# Patient Record
Sex: FEMALE
[De-identification: names, ages, dates, MRNs, and addresses within clinical notes are randomized per-mention and may not be internally consistent; named-entity substitution may affect disease eponyms.]

---

## 2020-05-02 ENCOUNTER — NURSE TRIAGE (OUTPATIENT)
Dept: OTHER | Facility: CLINIC | Age: 75
End: 2020-05-02
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
## 2023-01-01 ENCOUNTER — HOSPITAL ENCOUNTER (OUTPATIENT)
Dept: DATA CONVERSION | Facility: HOSPITAL | Age: 78
Discharge: HOME | End: 2023-09-04
Payer: MEDICARE

## 2023-01-01 ENCOUNTER — APPOINTMENT (OUTPATIENT)
Dept: HEMATOLOGY/ONCOLOGY | Facility: CLINIC | Age: 78
End: 2023-01-01
Payer: MEDICARE

## 2023-01-01 ENCOUNTER — HOSPITAL ENCOUNTER (OUTPATIENT)
Dept: DATA CONVERSION | Facility: HOSPITAL | Age: 78
Discharge: HOME | End: 2023-09-28
Payer: MEDICARE

## 2023-01-01 ENCOUNTER — APPOINTMENT (OUTPATIENT)
Dept: LAB | Facility: CLINIC | Age: 78
End: 2023-01-01
Payer: MEDICARE

## 2023-01-01 ENCOUNTER — HOSPITAL ENCOUNTER (OUTPATIENT)
Dept: DATA CONVERSION | Facility: HOSPITAL | Age: 78
End: 2023-01-01

## 2023-01-01 ENCOUNTER — HOSPITAL ENCOUNTER (OUTPATIENT)
Dept: DATA CONVERSION | Facility: HOSPITAL | Age: 78
Discharge: HOME | End: 2023-09-05
Payer: MEDICARE

## 2023-01-01 ENCOUNTER — HOSPITAL ENCOUNTER (OUTPATIENT)
Dept: DATA CONVERSION | Facility: HOSPITAL | Age: 78
Setting detail: OBSERVATION
Discharge: HOME | End: 2023-09-29
Attending: INTERNAL MEDICINE | Admitting: INTERNAL MEDICINE
Payer: MEDICARE

## 2023-01-01 ENCOUNTER — HOSPITAL ENCOUNTER (OUTPATIENT)
Facility: HOSPITAL | Age: 78
Setting detail: OBSERVATION
Discharge: HOME | End: 2023-10-06
Attending: EMERGENCY MEDICINE | Admitting: INTERNAL MEDICINE
Payer: MEDICARE

## 2023-01-01 ENCOUNTER — SOCIAL WORK (OUTPATIENT)
Dept: HEMATOLOGY/ONCOLOGY | Facility: CLINIC | Age: 78
End: 2023-01-01
Payer: MEDICARE

## 2023-01-01 ENCOUNTER — TELEPHONE (OUTPATIENT)
Dept: HEMATOLOGY/ONCOLOGY | Facility: CLINIC | Age: 78
End: 2023-01-01
Payer: MEDICARE

## 2023-01-01 ENCOUNTER — APPOINTMENT (OUTPATIENT)
Dept: RADIOLOGY | Facility: HOSPITAL | Age: 78
End: 2023-01-01
Payer: MEDICARE

## 2023-01-01 ENCOUNTER — HOSPITAL ENCOUNTER (OUTPATIENT)
Dept: CARDIOLOGY | Facility: HOSPITAL | Age: 78
Discharge: HOME | End: 2023-10-11
Payer: MEDICARE

## 2023-01-01 ENCOUNTER — DOCUMENTATION (OUTPATIENT)
Dept: CARDIAC SURGERY | Facility: CLINIC | Age: 78
End: 2023-01-01

## 2023-01-01 VITALS
HEART RATE: 80 BPM | DIASTOLIC BLOOD PRESSURE: 56 MMHG | BODY MASS INDEX: 36.14 KG/M2 | OXYGEN SATURATION: 100 % | WEIGHT: 216.93 LBS | TEMPERATURE: 98.4 F | SYSTOLIC BLOOD PRESSURE: 127 MMHG | RESPIRATION RATE: 18 BRPM | HEIGHT: 65 IN

## 2023-01-01 VITALS — HEIGHT: 65 IN | BODY MASS INDEX: 17.96 KG/M2 | WEIGHT: 107.81 LBS

## 2023-01-01 DIAGNOSIS — T85.9XXA UNSPECIFIED COMPLICATION OF INTERNAL PROSTHETIC DEVICE, IMPLANT AND GRAFT, INITIAL ENCOUNTER: ICD-10-CM

## 2023-01-01 DIAGNOSIS — C34.91: ICD-10-CM

## 2023-01-01 DIAGNOSIS — J98.59 MEDIASTINAL MASS: ICD-10-CM

## 2023-01-01 DIAGNOSIS — J94.8 HYDROPNEUMOTHORAX: ICD-10-CM

## 2023-01-01 DIAGNOSIS — Z98.890 OTHER SPECIFIED POSTPROCEDURAL STATES: ICD-10-CM

## 2023-01-01 DIAGNOSIS — R06.02 SHORTNESS OF BREATH: Primary | ICD-10-CM

## 2023-01-01 DIAGNOSIS — J91.0 MALIGNANT PLEURAL EFFUSION (CMS-HCC): ICD-10-CM

## 2023-01-01 DIAGNOSIS — K21.9 GASTROESOPHAGEAL REFLUX DISEASE WITHOUT ESOPHAGITIS: ICD-10-CM

## 2023-01-01 DIAGNOSIS — Z29.89 ENCOUNTER FOR OTHER SPECIFIED PROPHYLACTIC MEASURES: ICD-10-CM

## 2023-01-01 DIAGNOSIS — C34.11 MALIGNANT NEOPLASM OF UPPER LOBE, RIGHT BRONCHUS OR LUNG (MULTI): ICD-10-CM

## 2023-01-01 DIAGNOSIS — C34.91 NON-SMALL CELL CANCER OF RIGHT LUNG (MULTI): ICD-10-CM

## 2023-01-01 DIAGNOSIS — J90 PLEURAL EFFUSION, NOT ELSEWHERE CLASSIFIED: ICD-10-CM

## 2023-01-01 DIAGNOSIS — C34.91 MALIGNANT NEOPLASM OF UNSPECIFIED PART OF RIGHT BRONCHUS OR LUNG (MULTI): ICD-10-CM

## 2023-01-01 DIAGNOSIS — Z88.0 ALLERGY STATUS TO PENICILLIN: ICD-10-CM

## 2023-01-01 DIAGNOSIS — C34.91: Primary | ICD-10-CM

## 2023-01-01 DIAGNOSIS — Z28.9 IMMUNIZATION NOT CARRIED OUT FOR UNSPECIFIED REASON: ICD-10-CM

## 2023-01-01 DIAGNOSIS — R06.00 DYSPNEA, UNSPECIFIED: ICD-10-CM

## 2023-01-01 DIAGNOSIS — F17.200 TOBACCO DEPENDENCE: ICD-10-CM

## 2023-01-01 DIAGNOSIS — Z28.310 UNVACCINATED FOR COVID-19: ICD-10-CM

## 2023-01-01 DIAGNOSIS — K59.03 DRUG-INDUCED CONSTIPATION: ICD-10-CM

## 2023-01-01 DIAGNOSIS — F51.01 PRIMARY INSOMNIA: ICD-10-CM

## 2023-01-01 DIAGNOSIS — Z20.822 CONTACT WITH AND (SUSPECTED) EXPOSURE TO COVID-19: ICD-10-CM

## 2023-01-01 DIAGNOSIS — Z85.118 PERSONAL HISTORY OF OTHER MALIGNANT NEOPLASM OF BRONCHUS AND LUNG: ICD-10-CM

## 2023-01-01 DIAGNOSIS — T85.698A OTHER MECHANICAL COMPLICATION OF OTHER SPECIFIED INTERNAL PROSTHETIC DEVICES, IMPLANTS AND GRAFTS, INITIAL ENCOUNTER: ICD-10-CM

## 2023-01-01 DIAGNOSIS — C34.90 MALIGNANT NEOPLASM OF UNSPECIFIED PART OF UNSPECIFIED BRONCHUS OR LUNG (MULTI): ICD-10-CM

## 2023-01-01 DIAGNOSIS — R06.02 SHORTNESS OF BREATH: ICD-10-CM

## 2023-01-01 LAB
ALBUMIN SERPL-MCNC: 3 GM/DL (ref 3.5–5)
ALBUMIN SERPL-MCNC: 3.4 GM/DL (ref 3.5–5)
ALBUMIN SERPL-MCNC: 3.6 GM/DL (ref 3.5–5)
ALBUMIN/GLOB SERPL: 1.1 RATIO (ref 1.5–3)
ALBUMIN/GLOB SERPL: 1.2 RATIO (ref 1.5–3)
ALBUMIN/GLOB SERPL: 1.3 RATIO (ref 1.5–3)
ALP BLD-CCNC: 105 U/L (ref 35–125)
ALP BLD-CCNC: 88 U/L (ref 35–125)
ALP BLD-CCNC: 88 U/L (ref 35–125)
ALT SERPL-CCNC: 11 U/L (ref 5–40)
ALT SERPL-CCNC: 14 U/L (ref 5–40)
ALT SERPL-CCNC: 14 U/L (ref 5–40)
ANION GAP SERPL CALC-SCNC: 12 MMOL/L
ANION GAP SERPL CALC-SCNC: 12 MMOL/L
ANION GAP SERPL CALCULATED.3IONS-SCNC: 11 MMOL/L (ref 0–19)
ANION GAP SERPL CALCULATED.3IONS-SCNC: 12 MMOL/L (ref 0–19)
ANION GAP SERPL CALCULATED.3IONS-SCNC: 14 MMOL/L (ref 0–19)
ANISOCYTOSIS BLD QL SMEAR: ABNORMAL
ANTICOAGULANT: NORMAL
APTT PPP: 29.2 SEC (ref 22–32.5)
AST SERPL-CCNC: 13 U/L (ref 5–40)
AST SERPL-CCNC: 15 U/L (ref 5–40)
AST SERPL-CCNC: 18 U/L (ref 5–40)
ATRIAL RATE: 102 BPM
BACTERIA SPEC CULT: NORMAL
BACTERIA UR QL AUTO: NEGATIVE
BASOPHILS # BLD AUTO: 0.03 K/UL (ref 0–0.22)
BASOPHILS # BLD AUTO: 0.05 X10*3/UL (ref 0–0.1)
BASOPHILS # BLD AUTO: 0.06 K/UL (ref 0–0.22)
BASOPHILS # BLD AUTO: 0.06 X10*3/UL (ref 0–0.1)
BASOPHILS NFR BLD AUTO: 0.3 % (ref 0–1)
BASOPHILS NFR BLD AUTO: 0.6 %
BASOPHILS NFR BLD AUTO: 0.7 % (ref 0–1)
BASOPHILS NFR BLD AUTO: 0.9 %
BILIRUB DIRECT SERPL-MCNC: 0.2 MG/DL (ref 0–0.2)
BILIRUB INDIRECT SERPL-MCNC: 0.3 MG/DL (ref 0–0.8)
BILIRUB SERPL-MCNC: 0.4 MG/DL (ref 0.1–1.2)
BILIRUB SERPL-MCNC: 0.4 MG/DL (ref 0.1–1.2)
BILIRUB SERPL-MCNC: 0.5 MG/DL (ref 0.1–1.2)
BILIRUB UR QL STRIP.AUTO: NEGATIVE
BLOOD CULTURE RESULTS -LH SQ DATA CONVERSION: NORMAL
BLOOD CULTURE RESULTS -LH SQ DATA CONVERSION: NORMAL
BUN SERPL-MCNC: 14 MG/DL (ref 8–25)
BUN SERPL-MCNC: 17 MG/DL (ref 8–25)
BUN SERPL-MCNC: 18 MG/DL (ref 8–25)
BUN SERPL-MCNC: 20 MG/DL (ref 8–25)
BUN SERPL-MCNC: 25 MG/DL (ref 8–25)
BUN/CREAT SERPL: 22.5 RATIO (ref 8–21)
BUN/CREAT SERPL: 25 RATIO (ref 8–21)
BUN/CREAT SERPL: 31.3 RATIO (ref 8–21)
CALCIUM SERPL-MCNC: 10.1 MG/DL (ref 8.5–10.4)
CALCIUM SERPL-MCNC: 10.5 MG/DL (ref 8.5–10.4)
CALCIUM SERPL-MCNC: 9.5 MG/DL (ref 8.5–10.4)
CALCIUM SERPL-MCNC: 9.8 MG/DL (ref 8.5–10.4)
CALCIUM SERPL-MCNC: 9.8 MG/DL (ref 8.5–10.4)
CC # UR: NORMAL /UL
CHLORIDE SERPL-SCNC: 102 MMOL/L (ref 97–107)
CHLORIDE SERPL-SCNC: 104 MMOL/L (ref 97–107)
CHLORIDE SERPL-SCNC: 106 MMOL/L (ref 97–107)
CHLORIDE SERPL-SCNC: 108 MMOL/L (ref 97–107)
CHLORIDE SERPL-SCNC: 109 MMOL/L (ref 97–107)
CLARITY UR: CLEAR
CO2 SERPL-SCNC: 22 MMOL/L (ref 24–31)
CO2 SERPL-SCNC: 22 MMOL/L (ref 24–31)
CO2 SERPL-SCNC: 24 MMOL/L (ref 24–31)
CO2 SERPL-SCNC: 25 MMOL/L (ref 24–31)
CO2 SERPL-SCNC: 27 MMOL/L (ref 24–31)
COLOR UR: YELLOW
CREAT SERPL-MCNC: 0.8 MG/DL (ref 0.4–1.6)
DACRYOCYTES BLD QL SMEAR: ABNORMAL
DEPRECATED RDW RBC AUTO: 35.9 FL (ref 37–54)
DEPRECATED RDW RBC AUTO: 37.7 FL (ref 37–54)
DEPRECATED RDW RBC AUTO: 38.3 FL (ref 37–54)
DIFFERENTIAL METHOD BLD: ABNORMAL
DIFFERENTIAL METHOD BLD: ABNORMAL
EOSINOPHIL # BLD AUTO: 0.01 K/UL (ref 0–0.45)
EOSINOPHIL # BLD AUTO: 0.14 X10*3/UL (ref 0–0.4)
EOSINOPHIL # BLD AUTO: 0.16 K/UL (ref 0–0.45)
EOSINOPHIL # BLD AUTO: 0.23 X10*3/UL (ref 0–0.4)
EOSINOPHIL NFR BLD AUTO: 1.7 %
EOSINOPHIL NFR BLD AUTO: 3.4 %
EOSINOPHIL NFR BLD: 0.1 % (ref 0–3)
EOSINOPHIL NFR BLD: 1.5 % (ref 0–3)
ERYTHROCYTE [DISTWIDTH] IN BLOOD BY AUTOMATED COUNT: 16.1 % (ref 11.7–15)
ERYTHROCYTE [DISTWIDTH] IN BLOOD BY AUTOMATED COUNT: 16.8 % (ref 11.7–15)
ERYTHROCYTE [DISTWIDTH] IN BLOOD BY AUTOMATED COUNT: 17.2 % (ref 11.7–15)
ERYTHROCYTE [DISTWIDTH] IN BLOOD BY AUTOMATED COUNT: 17.4 % (ref 11.5–14.5)
ERYTHROCYTE [DISTWIDTH] IN BLOOD BY AUTOMATED COUNT: 17.4 % (ref 11.5–14.5)
ERYTHROCYTE [DISTWIDTH] IN BLOOD BY AUTOMATED COUNT: 17.9 % (ref 11.5–14.5)
EUA DISCLAIMER: NORMAL
FERRITIN SERPL-MCNC: 280 NG/ML (ref 13–150)
FLUAV RNA NPH QL NAA+PROBE: NEGATIVE
FLUBV RNA NPH QL NAA+PROBE: NEGATIVE
FOLATE SERPL-MCNC: 20 NG/ML (ref 4.2–19.9)
GFR SERPL CREATININE-BSD FRML MDRD: 74 ML/MIN/1.73 M2
GFR SERPL CREATININE-BSD FRML MDRD: 75 ML/MIN/1.73M*2
GFR SERPL CREATININE-BSD FRML MDRD: 75 ML/MIN/1.73M*2
GLOBULIN SER-MCNC: 2.5 G/DL (ref 1.9–3.7)
GLOBULIN SER-MCNC: 2.7 G/DL (ref 1.9–3.7)
GLOBULIN SER-MCNC: 3.2 G/DL (ref 1.9–3.7)
GLUCOSE SERPL-MCNC: 102 MG/DL (ref 65–99)
GLUCOSE SERPL-MCNC: 124 MG/DL (ref 65–99)
GLUCOSE SERPL-MCNC: 81 MG/DL (ref 65–99)
GLUCOSE SERPL-MCNC: 89 MG/DL (ref 65–99)
GLUCOSE SERPL-MCNC: 99 MG/DL (ref 65–99)
GLUCOSE UR STRIP.AUTO-MCNC: NEGATIVE MG/DL
HCT VFR BLD AUTO: 29.6 % (ref 36–44)
HCT VFR BLD AUTO: 31.4 % (ref 36–46)
HCT VFR BLD AUTO: 31.6 % (ref 36–44)
HCT VFR BLD AUTO: 31.8 % (ref 36–46)
HCT VFR BLD AUTO: 34 % (ref 36–44)
HCT VFR BLD AUTO: 34.2 % (ref 36–46)
HCT VFR BLD AUTO: 35.6 % (ref 36–44)
HGB BLD-MCNC: 10 GM/DL (ref 12–15)
HGB BLD-MCNC: 10.1 G/DL (ref 12–16)
HGB BLD-MCNC: 10.2 G/DL (ref 12–16)
HGB BLD-MCNC: 10.7 G/DL (ref 12–16)
HGB BLD-MCNC: 10.9 GM/DL (ref 12–15)
HGB BLD-MCNC: 11.4 GM/DL (ref 12–15)
HGB BLD-MCNC: 9.4 GM/DL (ref 12–15)
HGB UR QL STRIP.AUTO: 2 /HPF (ref 0–3)
HGB UR QL: NEGATIVE
HYALINE CASTS UR QL AUTO: 9 /LPF
IMM GRANULOCYTES # BLD AUTO: 0.02 X10*3/UL (ref 0–0.5)
IMM GRANULOCYTES # BLD AUTO: 0.03 X10*3/UL (ref 0–0.5)
IMM GRANULOCYTES # BLD AUTO: 0.04 K/UL (ref 0–0.1)
IMM GRANULOCYTES # BLD AUTO: 0.06 K/UL (ref 0–0.1)
IMM GRANULOCYTES NFR BLD AUTO: 0.3 % (ref 0–0.9)
IMM GRANULOCYTES NFR BLD AUTO: 0.4 % (ref 0–0.9)
INR PPP: 1.1 (ref 0.86–1.16)
IRON SATN MFR SERPL: 13.3 % (ref 12–50)
IRON SERPL-MCNC: 24 UG/DL (ref 30–160)
KETONES UR QL STRIP.AUTO: ABNORMAL
LACTATE BLDV-SCNC: 1.3 MMOL/L (ref 0.4–2)
LACTATE BLDV-SCNC: 1.4 MMOL/L (ref 0.4–2)
LEUKOCYTE ESTERASE UR QL STRIP.AUTO: NEGATIVE
LYMPHOCYTES # BLD AUTO: 1.1 K/UL (ref 1.2–3.2)
LYMPHOCYTES # BLD AUTO: 1.33 X10*3/UL (ref 0.8–3)
LYMPHOCYTES # BLD AUTO: 1.52 X10*3/UL (ref 0.8–3)
LYMPHOCYTES # BLD AUTO: 1.83 K/UL (ref 1.2–3.2)
LYMPHOCYTES NFR BLD AUTO: 18.5 %
LYMPHOCYTES NFR BLD AUTO: 19.5 %
LYMPHOCYTES NFR BLD MANUAL: 12.3 % (ref 20–40)
LYMPHOCYTES NFR BLD MANUAL: 16.9 % (ref 20–40)
MCH RBC QN AUTO: 20.1 PG (ref 26–34)
MCH RBC QN AUTO: 20.5 PG (ref 26–34)
MCH RBC QN AUTO: 20.5 PG (ref 26–34)
MCH RBC QN AUTO: 20.6 PG (ref 26–34)
MCH RBC QN AUTO: 20.7 PG (ref 26–34)
MCH RBC QN AUTO: 20.7 PG (ref 26–34)
MCHC RBC AUTO-ENTMCNC: 31.3 G/DL (ref 32–36)
MCHC RBC AUTO-ENTMCNC: 31.8 % (ref 31–37)
MCHC RBC AUTO-ENTMCNC: 32 % (ref 31–37)
MCHC RBC AUTO-ENTMCNC: 32.1 % (ref 31–37)
MCHC RBC AUTO-ENTMCNC: 32.1 G/DL (ref 32–36)
MCHC RBC AUTO-ENTMCNC: 32.2 G/DL (ref 32–36)
MCV RBC AUTO: 64 FL (ref 80–100)
MCV RBC AUTO: 64.5 FL (ref 80–100)
MCV RBC AUTO: 64.7 FL (ref 80–100)
MICROCYTES BLD QL SMEAR: ABNORMAL
MICROCYTES BLD QL SMEAR: ABNORMAL
MICROSCOPIC (UA): ABNORMAL
MONOCYTES # BLD AUTO: 0.63 K/UL (ref 0–0.8)
MONOCYTES # BLD AUTO: 0.66 X10*3/UL (ref 0.05–0.8)
MONOCYTES # BLD AUTO: 0.7 X10*3/UL (ref 0.05–0.8)
MONOCYTES # BLD AUTO: 0.79 K/UL (ref 0–0.8)
MONOCYTES NFR BLD AUTO: 10.2 %
MONOCYTES NFR BLD AUTO: 8 %
MONOCYTES NFR BLD MANUAL: 7.1 % (ref 0–8)
MONOCYTES NFR BLD MANUAL: 7.3 % (ref 0–8)
NEUTROPHILS # BLD AUTO: 4.49 X10*3/UL (ref 1.6–5.5)
NEUTROPHILS # BLD AUTO: 5.82 X10*3/UL (ref 1.6–5.5)
NEUTROPHILS # BLD AUTO: 7.07 K/UL
NEUTROPHILS # BLD AUTO: 7.07 K/UL (ref 1.8–7.7)
NEUTROPHILS # BLD AUTO: 7.95 K/UL
NEUTROPHILS # BLD AUTO: 7.95 K/UL (ref 1.8–7.7)
NEUTROPHILS NFR BLD AUTO: 65.7 %
NEUTROPHILS NFR BLD AUTO: 70.8 %
NEUTROPHILS.IMMATURE NFR BLD: 0.4 % (ref 0–1)
NEUTROPHILS.IMMATURE NFR BLD: 0.6 % (ref 0–1)
NEUTS SEG NFR BLD: 73.4 % (ref 50–70)
NEUTS SEG NFR BLD: 79.4 % (ref 50–70)
NITRITE UR QL STRIP.AUTO: NEGATIVE
NRBC BLD-RTO: 0 /100 WBC
NRBC BLD-RTO: 0 /100 WBCS (ref 0–0)
OVALOCYTES BLD QL SMEAR: ABNORMAL
OVALOCYTES BLD QL SMEAR: ABNORMAL
P AXIS: 83 DEGREES
P OFFSET: 212 MS
P ONSET: 164 MS
PH UR STRIP.AUTO: 6 [PH] (ref 4.6–8)
PLATELET # BLD AUTO: 286 K/UL (ref 150–450)
PLATELET # BLD AUTO: 337 X10*3/UL (ref 150–450)
PLATELET # BLD AUTO: 348 K/UL (ref 150–450)
PLATELET # BLD AUTO: 373 X10*3/UL (ref 150–450)
PLATELET # BLD AUTO: 409 X10*3/UL (ref 150–450)
PLATELET # BLD AUTO: 458 K/UL (ref 150–450)
PLATELET BLD QL SMEAR: ABNORMAL
PLATELET BLD QL SMEAR: ABNORMAL
PMV BLD AUTO: 10.4 CU (ref 7–12.6)
PMV BLD AUTO: 10.6 CU (ref 7–12.6)
PMV BLD AUTO: 10.7 FL (ref 7.5–11.5)
PMV BLD AUTO: 10.9 FL (ref 7.5–11.5)
PMV BLD AUTO: 11.1 CU (ref 7–12.6)
PMV BLD AUTO: 11.3 FL (ref 7.5–11.5)
POIKILOCYTOSIS BLD QL SMEAR: ABNORMAL
POTASSIUM SERPL-SCNC: 3 MMOL/L (ref 3.4–5.1)
POTASSIUM SERPL-SCNC: 3.3 MMOL/L (ref 3.4–5.1)
POTASSIUM SERPL-SCNC: 3.7 MMOL/L (ref 3.4–5.1)
POTASSIUM SERPL-SCNC: 3.8 MMOL/L (ref 3.4–5.1)
POTASSIUM SERPL-SCNC: 3.8 MMOL/L (ref 3.4–5.1)
PR INTERVAL: 116 MS
PROT SERPL-MCNC: 5.5 G/DL (ref 5.9–7.9)
PROT SERPL-MCNC: 6.3 G/DL (ref 5.9–7.9)
PROT SERPL-MCNC: 6.6 G/DL (ref 5.9–7.9)
PROT UR STRIP.AUTO-MCNC: ABNORMAL MG/DL
PROTHROMBIN TIME: 11.3 SEC (ref 9.3–12.7)
Q ONSET: 222 MS
QRS COUNT: 16 BEATS
QRS DURATION: 66 MS
QT INTERVAL: 350 MS
QTC CALCULATION(BAZETT): 456 MS
QTC FREDERICIA: 417 MS
R AXIS: 58 DEGREES
RBC # BLD AUTO: 4.59 M/UL (ref 4–4.9)
RBC # BLD AUTO: 4.88 X10*6/UL (ref 4–5.2)
RBC # BLD AUTO: 4.94 X10*6/UL (ref 4–5.2)
RBC # BLD AUTO: 5.31 M/UL (ref 4–4.9)
RBC # BLD AUTO: 5.32 X10*6/UL (ref 4–5.2)
RBC # BLD AUTO: 5.5 M/UL (ref 4–4.9)
REPORT STATUS -LH SQ DATA CONVERSION: NORMAL
SARS-COV-2 RNA SPEC QL NAA+PROBE: NEGATIVE
SCHISTOCYTES BLD QL SMEAR: ABNORMAL
SERVICE CMNT-IMP: NORMAL
SODIUM SERPL-SCNC: 138 MMOL/L (ref 133–145)
SODIUM SERPL-SCNC: 142 MMOL/L (ref 133–145)
SODIUM SERPL-SCNC: 142 MMOL/L (ref 133–145)
SODIUM SERPL-SCNC: 143 MMOL/L (ref 133–145)
SODIUM SERPL-SCNC: 144 MMOL/L (ref 133–145)
SOURCE,MICRO -LH SQ DATA CONVERSION: NORMAL
SOURCE,MICRO -LH SQ DATA CONVERSION: NORMAL
SP GR UR STRIP.AUTO: >1.04 (ref 1–1.03)
SPECIMEN SOURCE: NORMAL
SQUAMOUS UR QL AUTO: ABNORMAL /HPF
T AXIS: 87 DEGREES
T OFFSET: 397 MS
TARGETS BLD QL SMEAR: ABNORMAL
TIBC SERPL-MCNC: 180 UG/DL (ref 228–428)
UROBILINOGEN UR QL STRIP.AUTO: NORMAL MG/DL (ref 0–1)
VENTRICULAR RATE: 102 BPM
VIT B12 SERPL-MCNC: 1755 PG/ML (ref 211–946)
WBC # BLD AUTO: 10.8 K/UL (ref 4.5–11)
WBC # BLD AUTO: 6 X10*3/UL (ref 4.4–11.3)
WBC # BLD AUTO: 6.8 X10*3/UL (ref 4.4–11.3)
WBC # BLD AUTO: 7.1 K/UL (ref 4.5–11)
WBC # BLD AUTO: 8.2 X10*3/UL (ref 4.4–11.3)
WBC # BLD AUTO: 8.9 K/UL (ref 4.5–11)
WBC #/AREA URNS AUTO: 5 /HPF (ref 0–3)
WBC MORPH BLD: ABNORMAL
WBC MORPH BLD: ABNORMAL

## 2023-01-01 PROCEDURE — 99232 SBSQ HOSP IP/OBS MODERATE 35: CPT | Performed by: NURSE PRACTITIONER

## 2023-01-01 PROCEDURE — 2500000001 HC RX 250 WO HCPCS SELF ADMINISTERED DRUGS (ALT 637 FOR MEDICARE OP): Performed by: INTERNAL MEDICINE

## 2023-01-01 PROCEDURE — 97162 PT EVAL MOD COMPLEX 30 MIN: CPT | Mod: GP

## 2023-01-01 PROCEDURE — 36415 COLL VENOUS BLD VENIPUNCTURE: CPT

## 2023-01-01 PROCEDURE — 80048 BASIC METABOLIC PNL TOTAL CA: CPT | Performed by: INTERNAL MEDICINE

## 2023-01-01 PROCEDURE — 2500000004 HC RX 250 GENERAL PHARMACY W/ HCPCS (ALT 636 FOR OP/ED): Performed by: INTERNAL MEDICINE

## 2023-01-01 PROCEDURE — 2500000004 HC RX 250 GENERAL PHARMACY W/ HCPCS (ALT 636 FOR OP/ED): Performed by: NURSE PRACTITIONER

## 2023-01-01 PROCEDURE — 9420000001 HC RT PATIENT EDUCATION 5 MIN

## 2023-01-01 PROCEDURE — 99497 ADVNCD CARE PLAN 30 MIN: CPT | Performed by: NURSE PRACTITIONER

## 2023-01-01 PROCEDURE — 2500000001 HC RX 250 WO HCPCS SELF ADMINISTERED DRUGS (ALT 637 FOR MEDICARE OP): Performed by: NURSE PRACTITIONER

## 2023-01-01 PROCEDURE — 97166 OT EVAL MOD COMPLEX 45 MIN: CPT | Mod: GO

## 2023-01-01 PROCEDURE — 36415 COLL VENOUS BLD VENIPUNCTURE: CPT | Performed by: NURSE PRACTITIONER

## 2023-01-01 PROCEDURE — 85025 COMPLETE CBC W/AUTO DIFF WBC: CPT | Performed by: INTERNAL MEDICINE

## 2023-01-01 PROCEDURE — 80048 BASIC METABOLIC PNL TOTAL CA: CPT

## 2023-01-01 PROCEDURE — 2500000002 HC RX 250 W HCPCS SELF ADMINISTERED DRUGS (ALT 637 FOR MEDICARE OP, ALT 636 FOR OP/ED): Performed by: INTERNAL MEDICINE

## 2023-01-01 PROCEDURE — 96372 THER/PROPH/DIAG INJ SC/IM: CPT | Performed by: NURSE PRACTITIONER

## 2023-01-01 PROCEDURE — 99223 1ST HOSP IP/OBS HIGH 75: CPT | Performed by: NURSE PRACTITIONER

## 2023-01-01 PROCEDURE — 71046 X-RAY EXAM CHEST 2 VIEWS: CPT | Mod: FY

## 2023-01-01 PROCEDURE — 80048 BASIC METABOLIC PNL TOTAL CA: CPT | Performed by: EMERGENCY MEDICINE

## 2023-01-01 PROCEDURE — 94640 AIRWAY INHALATION TREATMENT: CPT

## 2023-01-01 PROCEDURE — 2500000005 HC RX 250 GENERAL PHARMACY W/O HCPCS: Performed by: INTERNAL MEDICINE

## 2023-01-01 PROCEDURE — 36415 COLL VENOUS BLD VENIPUNCTURE: CPT | Performed by: INTERNAL MEDICINE

## 2023-01-01 PROCEDURE — 93005 ELECTROCARDIOGRAM TRACING: CPT

## 2023-01-01 PROCEDURE — 85025 COMPLETE CBC W/AUTO DIFF WBC: CPT | Performed by: EMERGENCY MEDICINE

## 2023-01-01 PROCEDURE — 2500000005 HC RX 250 GENERAL PHARMACY W/O HCPCS: Performed by: NURSE PRACTITIONER

## 2023-01-01 PROCEDURE — 85027 COMPLETE CBC AUTOMATED: CPT | Performed by: NURSE PRACTITIONER

## 2023-01-01 PROCEDURE — 93010 ELECTROCARDIOGRAM REPORT: CPT | Performed by: INTERNAL MEDICINE

## 2023-01-01 PROCEDURE — 99498 ADVNCD CARE PLAN ADDL 30 MIN: CPT | Performed by: NURSE PRACTITIONER

## 2023-01-01 PROCEDURE — G0378 HOSPITAL OBSERVATION PER HR: HCPCS

## 2023-01-01 PROCEDURE — 99285 EMERGENCY DEPT VISIT HI MDM: CPT | Mod: 25 | Performed by: EMERGENCY MEDICINE

## 2023-01-01 RX ORDER — AMOXICILLIN 250 MG
1 CAPSULE ORAL NIGHTLY PRN
Status: DISCONTINUED | OUTPATIENT
Start: 2023-01-01 | End: 2023-01-01 | Stop reason: HOSPADM

## 2023-01-01 RX ORDER — LIDOCAINE 560 MG/1
1 PATCH PERCUTANEOUS; TOPICAL; TRANSDERMAL DAILY
Status: DISCONTINUED | OUTPATIENT
Start: 2023-01-01 | End: 2023-01-01 | Stop reason: HOSPADM

## 2023-01-01 RX ORDER — HEPARIN 100 UNIT/ML
500 SYRINGE INTRAVENOUS AS NEEDED
Status: CANCELLED | OUTPATIENT
Start: 2023-01-01

## 2023-01-01 RX ORDER — FERROUS SULFATE 325(65) MG
TABLET ORAL
COMMUNITY

## 2023-01-01 RX ORDER — OXYCODONE HYDROCHLORIDE 5 MG/1
5 TABLET ORAL EVERY 6 HOURS PRN
Status: ON HOLD | COMMUNITY
Start: 2023-01-01 | End: 2023-01-01 | Stop reason: ENTERED-IN-ERROR

## 2023-01-01 RX ORDER — CHOLECALCIFEROL (VITAMIN D3) 25 MCG
TABLET ORAL
Status: ON HOLD | COMMUNITY
End: 2023-01-01 | Stop reason: ENTERED-IN-ERROR

## 2023-01-01 RX ORDER — PRIMIDONE 50 MG/1
TABLET ORAL
Status: ON HOLD | COMMUNITY
Start: 2022-10-03 | End: 2023-01-01 | Stop reason: ENTERED-IN-ERROR

## 2023-01-01 RX ORDER — ALUMINUM HYDROXIDE, MAGNESIUM HYDROXIDE, AND SIMETHICONE 1200; 120; 1200 MG/30ML; MG/30ML; MG/30ML
10 SUSPENSION ORAL ONCE
Qty: 355 ML | Refills: 0 | Status: SHIPPED | OUTPATIENT
Start: 2023-01-01 | End: 2023-01-01 | Stop reason: SDUPTHER

## 2023-01-01 RX ORDER — HYDROCODONE BITARTRATE AND ACETAMINOPHEN 5; 325 MG/1; MG/1
TABLET ORAL
Status: ON HOLD | COMMUNITY
Start: 2015-11-13 | End: 2023-01-01 | Stop reason: ENTERED-IN-ERROR

## 2023-01-01 RX ORDER — ESCITALOPRAM OXALATE 10 MG/1
10 TABLET ORAL DAILY
Status: DISCONTINUED | OUTPATIENT
Start: 2023-01-01 | End: 2023-01-01 | Stop reason: HOSPADM

## 2023-01-01 RX ORDER — CAPSAICIN 0.03 G/100G
1 CREAM TOPICAL EVERY 8 HOURS
Status: DISCONTINUED | OUTPATIENT
Start: 2023-01-01 | End: 2023-01-01 | Stop reason: HOSPADM

## 2023-01-01 RX ORDER — GABAPENTIN 300 MG/1
300 CAPSULE ORAL 3 TIMES DAILY
Status: DISCONTINUED | OUTPATIENT
Start: 2023-01-01 | End: 2023-01-01 | Stop reason: HOSPADM

## 2023-01-01 RX ORDER — CAPSAICIN 0.03 G/100G
1 CREAM TOPICAL EVERY 8 HOURS
Status: ON HOLD | COMMUNITY
End: 2023-01-01 | Stop reason: ENTERED-IN-ERROR

## 2023-01-01 RX ORDER — ACETAMINOPHEN 325 MG/1
325 TABLET ORAL EVERY 6 HOURS PRN
Status: DISCONTINUED | OUTPATIENT
Start: 2023-01-01 | End: 2023-01-01 | Stop reason: HOSPADM

## 2023-01-01 RX ORDER — PREDNISONE 20 MG/1
TABLET ORAL
Status: ON HOLD | COMMUNITY
Start: 2023-01-01 | End: 2023-01-01 | Stop reason: ENTERED-IN-ERROR

## 2023-01-01 RX ORDER — LANOLIN ALCOHOL/MO/W.PET/CERES
CREAM (GRAM) TOPICAL
Status: ON HOLD | COMMUNITY
End: 2023-01-01 | Stop reason: ENTERED-IN-ERROR

## 2023-01-01 RX ORDER — DICLOFENAC SODIUM 25 MG/1
50 TABLET, DELAYED RELEASE ORAL 2 TIMES DAILY PRN
Status: DISCONTINUED | OUTPATIENT
Start: 2023-01-01 | End: 2023-01-01 | Stop reason: HOSPADM

## 2023-01-01 RX ORDER — ALPRAZOLAM 0.5 MG/1
0.5 TABLET ORAL 2 TIMES DAILY PRN
Status: ON HOLD | COMMUNITY
Start: 2023-01-01 | End: 2023-01-01 | Stop reason: ENTERED-IN-ERROR

## 2023-01-01 RX ORDER — FLUTICASONE FUROATE AND VILANTEROL 100; 25 UG/1; UG/1
POWDER RESPIRATORY (INHALATION)
Status: ON HOLD | COMMUNITY
End: 2023-01-01 | Stop reason: ENTERED-IN-ERROR

## 2023-01-01 RX ORDER — ALUMINUM HYDROXIDE, MAGNESIUM HYDROXIDE, AND SIMETHICONE 1200; 120; 1200 MG/30ML; MG/30ML; MG/30ML
10 SUSPENSION ORAL ONCE
Qty: 355 ML | Refills: 0
Start: 2023-01-01 | End: 2023-01-01

## 2023-01-01 RX ORDER — METHYLPREDNISOLONE 4 MG/1
TABLET ORAL
Status: ON HOLD | COMMUNITY
End: 2023-01-01 | Stop reason: ENTERED-IN-ERROR

## 2023-01-01 RX ORDER — POLYETHYLENE GLYCOL 3350 17 G/17G
POWDER, FOR SOLUTION ORAL
Status: ON HOLD | COMMUNITY
End: 2023-01-01 | Stop reason: ENTERED-IN-ERROR

## 2023-01-01 RX ORDER — FERROUS SULFATE, DRIED 160(50) MG
1 TABLET, EXTENDED RELEASE ORAL
Status: DISCONTINUED | OUTPATIENT
Start: 2023-01-01 | End: 2023-01-01 | Stop reason: HOSPADM

## 2023-01-01 RX ORDER — GUAIFENESIN 100 MG/5ML
100 SOLUTION ORAL EVERY 4 HOURS PRN
Status: DISCONTINUED | OUTPATIENT
Start: 2023-01-01 | End: 2023-01-01 | Stop reason: HOSPADM

## 2023-01-01 RX ORDER — CYPROHEPTADINE HYDROCHLORIDE 4 MG/1
TABLET ORAL
Status: ON HOLD | COMMUNITY
End: 2023-01-01 | Stop reason: ENTERED-IN-ERROR

## 2023-01-01 RX ORDER — PROCHLORPERAZINE MALEATE 10 MG
10 TABLET ORAL EVERY 6 HOURS PRN
Status: CANCELLED | OUTPATIENT
Start: 2023-01-01

## 2023-01-01 RX ORDER — SPIRONOLACTONE 25 MG
TABLET ORAL
Status: ON HOLD | COMMUNITY
Start: 2008-09-23 | End: 2023-01-01 | Stop reason: ENTERED-IN-ERROR

## 2023-01-01 RX ORDER — BUPROPION HYDROCHLORIDE 300 MG/1
300 TABLET ORAL
Status: ON HOLD | COMMUNITY
Start: 2023-01-01 | End: 2023-01-01 | Stop reason: ENTERED-IN-ERROR

## 2023-01-01 RX ORDER — FAMOTIDINE 10 MG/ML
20 INJECTION INTRAVENOUS ONCE AS NEEDED
Status: CANCELLED | OUTPATIENT
Start: 2023-01-01

## 2023-01-01 RX ORDER — ALPRAZOLAM 0.5 MG/1
0.5 TABLET ORAL 2 TIMES DAILY PRN
Status: DISCONTINUED | OUTPATIENT
Start: 2023-01-01 | End: 2023-01-01 | Stop reason: HOSPADM

## 2023-01-01 RX ORDER — ERGOCALCIFEROL 1.25 MG/1
CAPSULE ORAL
Status: ON HOLD | COMMUNITY
Start: 2023-01-01 | End: 2023-01-01 | Stop reason: ENTERED-IN-ERROR

## 2023-01-01 RX ORDER — BUPROPION HYDROCHLORIDE 300 MG/1
300 TABLET ORAL
Status: DISCONTINUED | OUTPATIENT
Start: 2023-01-01 | End: 2023-01-01

## 2023-01-01 RX ORDER — IBUPROFEN 200 MG
TABLET ORAL
Status: ON HOLD | COMMUNITY
Start: 2017-05-17 | End: 2023-01-01 | Stop reason: ENTERED-IN-ERROR

## 2023-01-01 RX ORDER — OMEPRAZOLE 20 MG/1
20 TABLET, DELAYED RELEASE ORAL DAILY
Start: 2023-01-01 | End: 2023-11-03 | Stop reason: CLARIF

## 2023-01-01 RX ORDER — IPRATROPIUM BROMIDE AND ALBUTEROL SULFATE 2.5; .5 MG/3ML; MG/3ML
3 SOLUTION RESPIRATORY (INHALATION) EVERY 6 HOURS PRN
Status: DISCONTINUED | OUTPATIENT
Start: 2023-01-01 | End: 2023-01-01 | Stop reason: HOSPADM

## 2023-01-01 RX ORDER — FLUTICASONE FUROATE AND VILANTEROL 200; 25 UG/1; UG/1
1 POWDER RESPIRATORY (INHALATION)
Status: DISCONTINUED | OUTPATIENT
Start: 2023-01-01 | End: 2023-01-01 | Stop reason: HOSPADM

## 2023-01-01 RX ORDER — DICLOFENAC POTASSIUM 50 MG/1
TABLET, FILM COATED ORAL
Status: ON HOLD | COMMUNITY
Start: 2023-01-01 | End: 2023-01-01 | Stop reason: ENTERED-IN-ERROR

## 2023-01-01 RX ORDER — FOLIC ACID 1 MG/1
TABLET ORAL
COMMUNITY
Start: 2023-01-01

## 2023-01-01 RX ORDER — POLYETHYLENE GLYCOL 3350, SODIUM SULFATE ANHYDROUS, SODIUM BICARBONATE, SODIUM CHLORIDE, POTASSIUM CHLORIDE 236; 22.74; 6.74; 5.86; 2.97 G/4L; G/4L; G/4L; G/4L; G/4L
POWDER, FOR SOLUTION ORAL
Status: ON HOLD | COMMUNITY
Start: 2018-06-20 | End: 2023-01-01 | Stop reason: ENTERED-IN-ERROR

## 2023-01-01 RX ORDER — FOLIC ACID 1 MG/1
1 TABLET ORAL DAILY
Status: DISCONTINUED | OUTPATIENT
Start: 2023-01-01 | End: 2023-01-01 | Stop reason: HOSPADM

## 2023-01-01 RX ORDER — MULTIVIT,IRON,MINERALS/LUTEIN
TABLET ORAL
Status: ON HOLD | COMMUNITY
End: 2023-01-01 | Stop reason: ENTERED-IN-ERROR

## 2023-01-01 RX ORDER — BUPROPION HYDROCHLORIDE 300 MG/1
300 TABLET ORAL DAILY
Status: DISCONTINUED | OUTPATIENT
Start: 2023-01-01 | End: 2023-01-01 | Stop reason: HOSPADM

## 2023-01-01 RX ORDER — AMOXICILLIN 250 MG
2 CAPSULE ORAL NIGHTLY
Status: DISCONTINUED | OUTPATIENT
Start: 2023-01-01 | End: 2023-01-01 | Stop reason: HOSPADM

## 2023-01-01 RX ORDER — ACETAMINOPHEN 325 MG/1
325 TABLET ORAL EVERY 6 HOURS PRN
Qty: 30 TABLET | Refills: 0 | Status: SHIPPED | OUTPATIENT
Start: 2023-01-01 | End: 2023-01-01 | Stop reason: SDUPTHER

## 2023-01-01 RX ORDER — MULTIVIT,CALC,MINS/IRON/FOLIC 500-18-0.4
TABLET ORAL
Status: ON HOLD | COMMUNITY
Start: 2008-09-23 | End: 2023-01-01 | Stop reason: ENTERED-IN-ERROR

## 2023-01-01 RX ORDER — TALC
3 POWDER (GRAM) TOPICAL NIGHTLY PRN
Status: DISCONTINUED | OUTPATIENT
Start: 2023-01-01 | End: 2023-01-01 | Stop reason: HOSPADM

## 2023-01-01 RX ORDER — ACETAMINOPHEN 325 MG/1
325 TABLET ORAL EVERY 6 HOURS PRN
Qty: 30 TABLET | Refills: 0
Start: 2023-01-01 | End: 2023-11-03 | Stop reason: CLARIF

## 2023-01-01 RX ORDER — ASCORBIC ACID 500 MG
500 TABLET ORAL DAILY
Status: DISCONTINUED | OUTPATIENT
Start: 2023-01-01 | End: 2023-01-01 | Stop reason: HOSPADM

## 2023-01-01 RX ORDER — POLYETHYLENE GLYCOL 3350 17 G/17G
17 POWDER, FOR SOLUTION ORAL DAILY
Status: DISCONTINUED | OUTPATIENT
Start: 2023-01-01 | End: 2023-01-01 | Stop reason: HOSPADM

## 2023-01-01 RX ORDER — IBUPROFEN 600 MG/1
TABLET ORAL
Status: ON HOLD | COMMUNITY
End: 2023-01-01 | Stop reason: ENTERED-IN-ERROR

## 2023-01-01 RX ORDER — HEPARIN SODIUM,PORCINE/PF 10 UNIT/ML
50 SYRINGE (ML) INTRAVENOUS AS NEEDED
Status: CANCELLED | OUTPATIENT
Start: 2023-01-01

## 2023-01-01 RX ORDER — BISACODYL 10 MG/1
SUPPOSITORY RECTAL
Status: ON HOLD | COMMUNITY
End: 2023-01-01 | Stop reason: ENTERED-IN-ERROR

## 2023-01-01 RX ORDER — FERROUS SULFATE 325(65) MG
65 TABLET ORAL
Status: DISCONTINUED | OUTPATIENT
Start: 2023-01-01 | End: 2023-01-01 | Stop reason: HOSPADM

## 2023-01-01 RX ORDER — FOLIC ACID 0.4 MG
0.4 TABLET ORAL DAILY
Status: DISCONTINUED | OUTPATIENT
Start: 2023-01-01 | End: 2023-01-01 | Stop reason: HOSPADM

## 2023-01-01 RX ORDER — LORAZEPAM 0.5 MG/1
0.5 TABLET ORAL EVERY 6 HOURS PRN
Status: DISCONTINUED | OUTPATIENT
Start: 2023-01-01 | End: 2023-01-01 | Stop reason: HOSPADM

## 2023-01-01 RX ORDER — TALC
3 POWDER (GRAM) TOPICAL NIGHTLY PRN
Refills: 0
Start: 2023-01-01 | End: 2023-11-03 | Stop reason: CLARIF

## 2023-01-01 RX ORDER — CAPSAICIN 0.75 MG/G
CREAM TOPICAL 3 TIMES DAILY PRN
Status: DISCONTINUED | OUTPATIENT
Start: 2023-01-01 | End: 2023-01-01 | Stop reason: HOSPADM

## 2023-01-01 RX ORDER — CALCIUM CARBONATE 200(500)MG
500 TABLET,CHEWABLE ORAL 4 TIMES DAILY PRN
Status: DISCONTINUED | OUTPATIENT
Start: 2023-01-01 | End: 2023-01-01 | Stop reason: HOSPADM

## 2023-01-01 RX ORDER — FLUTICASONE FUROATE AND VILANTEROL 200; 25 UG/1; UG/1
1 POWDER RESPIRATORY (INHALATION) DAILY
Status: DISCONTINUED | OUTPATIENT
Start: 2023-01-01 | End: 2023-01-01 | Stop reason: HOSPADM

## 2023-01-01 RX ORDER — ASPIRIN 81 MG/1
81 TABLET ORAL DAILY
Status: DISCONTINUED | OUTPATIENT
Start: 2023-01-01 | End: 2023-01-01 | Stop reason: HOSPADM

## 2023-01-01 RX ORDER — IBUPROFEN 600 MG/1
600 TABLET ORAL EVERY 6 HOURS PRN
Status: DISCONTINUED | OUTPATIENT
Start: 2023-01-01 | End: 2023-01-01 | Stop reason: HOSPADM

## 2023-01-01 RX ORDER — OMEPRAZOLE 20 MG/1
20 TABLET, DELAYED RELEASE ORAL DAILY
COMMUNITY
Start: 2023-01-01 | End: 2023-01-01 | Stop reason: SDUPTHER

## 2023-01-01 RX ORDER — MORPHINE SULFATE 10 MG/.5ML
5 SOLUTION ORAL EVERY 4 HOURS PRN
Status: DISCONTINUED | OUTPATIENT
Start: 2023-01-01 | End: 2023-01-01 | Stop reason: HOSPADM

## 2023-01-01 RX ORDER — UBIDECARENONE 75 MG
500 CAPSULE ORAL DAILY
Status: DISCONTINUED | OUTPATIENT
Start: 2023-01-01 | End: 2023-01-01 | Stop reason: HOSPADM

## 2023-01-01 RX ORDER — PROCHLORPERAZINE EDISYLATE 5 MG/ML
10 INJECTION INTRAMUSCULAR; INTRAVENOUS EVERY 6 HOURS PRN
Status: CANCELLED | OUTPATIENT
Start: 2023-01-01

## 2023-01-01 RX ORDER — PNV NO.95/FERROUS FUM/FOLIC AC 28MG-0.8MG
TABLET ORAL
Status: ON HOLD | COMMUNITY
Start: 2008-09-23 | End: 2023-01-01 | Stop reason: ENTERED-IN-ERROR

## 2023-01-01 RX ORDER — ALUMINUM HYDROXIDE, MAGNESIUM HYDROXIDE, AND SIMETHICONE 1200; 120; 1200 MG/30ML; MG/30ML; MG/30ML
10 SUSPENSION ORAL ONCE
Status: DISCONTINUED | OUTPATIENT
Start: 2023-01-01 | End: 2023-01-01 | Stop reason: HOSPADM

## 2023-01-01 RX ORDER — DULOXETIN HYDROCHLORIDE 60 MG/1
CAPSULE, DELAYED RELEASE ORAL
Status: ON HOLD | COMMUNITY
Start: 2023-01-01 | End: 2023-01-01 | Stop reason: ENTERED-IN-ERROR

## 2023-01-01 RX ORDER — UBIDECARENONE 75 MG
1 CAPSULE ORAL DAILY
Status: ON HOLD | COMMUNITY
End: 2023-01-01 | Stop reason: ENTERED-IN-ERROR

## 2023-01-01 RX ORDER — CALCIUM CARBONATE 200(500)MG
TABLET,CHEWABLE ORAL
Status: ON HOLD | COMMUNITY
End: 2023-01-01 | Stop reason: ENTERED-IN-ERROR

## 2023-01-01 RX ORDER — ALBUTEROL SULFATE 90 UG/1
2 AEROSOL, METERED RESPIRATORY (INHALATION) EVERY 4 HOURS PRN
Status: DISCONTINUED | OUTPATIENT
Start: 2023-01-01 | End: 2023-01-01 | Stop reason: HOSPADM

## 2023-01-01 RX ORDER — RIBOFLAVIN (VITAMIN B2) 50 MG
TABLET ORAL
Status: ON HOLD | COMMUNITY
Start: 2009-07-22 | End: 2023-01-01 | Stop reason: ENTERED-IN-ERROR

## 2023-01-01 RX ORDER — EPINEPHRINE 0.3 MG/.3ML
0.3 INJECTION SUBCUTANEOUS EVERY 5 MIN PRN
Status: CANCELLED | OUTPATIENT
Start: 2023-01-01

## 2023-01-01 RX ORDER — ADHESIVE BANDAGE
30 BANDAGE TOPICAL DAILY PRN
Status: DISCONTINUED | OUTPATIENT
Start: 2023-01-01 | End: 2023-01-01 | Stop reason: HOSPADM

## 2023-01-01 RX ORDER — ACETAMINOPHEN 325 MG/1
TABLET ORAL
Status: ON HOLD | COMMUNITY
End: 2023-01-01 | Stop reason: ENTERED-IN-ERROR

## 2023-01-01 RX ORDER — BISACODYL 10 MG/1
10 SUPPOSITORY RECTAL ONCE AS NEEDED
Status: DISCONTINUED | OUTPATIENT
Start: 2023-01-01 | End: 2023-01-01 | Stop reason: HOSPADM

## 2023-01-01 RX ORDER — ALPRAZOLAM 0.25 MG/1
0.25 TABLET ORAL 2 TIMES DAILY PRN
Status: ON HOLD | COMMUNITY
Start: 2023-01-01 | End: 2023-01-01 | Stop reason: ENTERED-IN-ERROR

## 2023-01-01 RX ORDER — ESCITALOPRAM OXALATE 10 MG/1
TABLET ORAL
Status: ON HOLD | COMMUNITY
End: 2023-01-01 | Stop reason: ENTERED-IN-ERROR

## 2023-01-01 RX ORDER — MORPHINE SULFATE 10 MG/.5ML
3 SOLUTION ORAL EVERY 4 HOURS PRN
Status: DISCONTINUED | OUTPATIENT
Start: 2023-01-01 | End: 2023-01-01

## 2023-01-01 RX ORDER — ONDANSETRON HYDROCHLORIDE 2 MG/ML
INJECTION, SOLUTION INTRAVENOUS
Status: ON HOLD | COMMUNITY
End: 2023-01-01 | Stop reason: ENTERED-IN-ERROR

## 2023-01-01 RX ORDER — ALBUTEROL SULFATE 0.83 MG/ML
3 SOLUTION RESPIRATORY (INHALATION) AS NEEDED
Status: CANCELLED | OUTPATIENT
Start: 2023-01-01

## 2023-01-01 RX ORDER — TIOTROPIUM BROMIDE 18 UG/1
CAPSULE ORAL; RESPIRATORY (INHALATION)
COMMUNITY
Start: 2023-01-01

## 2023-01-01 RX ORDER — ACETAMINOPHEN 325 MG/1
325 TABLET ORAL EVERY 6 HOURS PRN
Status: ON HOLD | COMMUNITY
End: 2023-01-01 | Stop reason: ENTERED-IN-ERROR

## 2023-01-01 RX ORDER — BUDESONIDE, GLYCOPYRROLATE, AND FORMOTEROL FUMARATE 160; 9; 4.8 UG/1; UG/1; UG/1
2 AEROSOL, METERED RESPIRATORY (INHALATION) 2 TIMES DAILY
Status: ON HOLD | COMMUNITY
Start: 2023-01-01 | End: 2023-01-01 | Stop reason: ENTERED-IN-ERROR

## 2023-01-01 RX ORDER — HEPARIN SODIUM 5000 [USP'U]/ML
5000 INJECTION, SOLUTION INTRAVENOUS; SUBCUTANEOUS EVERY 12 HOURS SCHEDULED
Status: DISCONTINUED | OUTPATIENT
Start: 2023-01-01 | End: 2023-01-01 | Stop reason: HOSPADM

## 2023-01-01 RX ORDER — NALOXONE HCL 8 MG/.1ML
SPRAY NASAL
Status: ON HOLD | COMMUNITY
Start: 2023-01-01 | End: 2023-01-01 | Stop reason: ENTERED-IN-ERROR

## 2023-01-01 RX ORDER — ALBUTEROL SULFATE 90 UG/1
2 AEROSOL, METERED RESPIRATORY (INHALATION) EVERY 4 HOURS PRN
COMMUNITY
Start: 2023-01-01

## 2023-01-01 RX ORDER — FLUTICASONE PROPIONATE AND SALMETEROL 100; 50 UG/1; UG/1
POWDER RESPIRATORY (INHALATION)
Status: ON HOLD | COMMUNITY
Start: 2009-07-22 | End: 2023-01-01 | Stop reason: ENTERED-IN-ERROR

## 2023-01-01 RX ORDER — GABAPENTIN 300 MG/1
CAPSULE ORAL
Status: ON HOLD | COMMUNITY
End: 2023-01-01 | Stop reason: ENTERED-IN-ERROR

## 2023-01-01 RX ORDER — DIPHENHYDRAMINE HYDROCHLORIDE 50 MG/ML
50 INJECTION INTRAMUSCULAR; INTRAVENOUS AS NEEDED
Status: CANCELLED | OUTPATIENT
Start: 2023-01-01

## 2023-01-01 RX ORDER — PANTOPRAZOLE SODIUM 40 MG/10ML
40 INJECTION, POWDER, LYOPHILIZED, FOR SOLUTION INTRAVENOUS DAILY
Status: DISCONTINUED | OUTPATIENT
Start: 2023-01-01 | End: 2023-01-01 | Stop reason: HOSPADM

## 2023-01-01 RX ORDER — AMOXICILLIN 250 MG
2 CAPSULE ORAL NIGHTLY
Start: 2023-01-01 | End: 2023-11-03 | Stop reason: CLARIF

## 2023-01-01 RX ORDER — DEXAMETHASONE 4 MG/1
TABLET ORAL
Status: ON HOLD | COMMUNITY
Start: 2023-01-01 | End: 2023-01-01 | Stop reason: ENTERED-IN-ERROR

## 2023-01-01 RX ORDER — ACETAMINOPHEN 325 MG/1
650 TABLET ORAL EVERY 6 HOURS PRN
Status: DISCONTINUED | OUTPATIENT
Start: 2023-01-01 | End: 2023-01-01 | Stop reason: HOSPADM

## 2023-01-01 RX ORDER — TIOTROPIUM BROMIDE AND OLODATEROL 3.124; 2.736 UG/1; UG/1
2 SPRAY, METERED RESPIRATORY (INHALATION) DAILY
Status: ON HOLD | COMMUNITY
End: 2023-01-01 | Stop reason: ENTERED-IN-ERROR

## 2023-01-01 RX ORDER — POLYETHYLENE GLYCOL 3350 17 G/17G
17 POWDER, FOR SOLUTION ORAL 2 TIMES DAILY
Start: 2023-01-01 | End: 2023-11-03 | Stop reason: CLARIF

## 2023-01-01 RX ORDER — DEXAMETHASONE 1 MG/1
TABLET ORAL
Status: ON HOLD | COMMUNITY
Start: 2023-01-01 | End: 2023-01-01 | Stop reason: ENTERED-IN-ERROR

## 2023-01-01 RX ORDER — IPRATROPIUM BROMIDE AND ALBUTEROL 20; 100 UG/1; UG/1
SPRAY, METERED RESPIRATORY (INHALATION)
Status: ON HOLD | COMMUNITY
Start: 2023-01-01 | End: 2023-01-01 | Stop reason: ENTERED-IN-ERROR

## 2023-01-01 RX ORDER — BACILLUS COAGULANS 1B CELL
CAPSULE ORAL
Status: ON HOLD | COMMUNITY
End: 2023-01-01 | Stop reason: ENTERED-IN-ERROR

## 2023-01-01 RX ORDER — LIDOCAINE 560 MG/1
PATCH PERCUTANEOUS; TOPICAL; TRANSDERMAL
Status: ON HOLD | COMMUNITY
End: 2023-01-01 | Stop reason: ENTERED-IN-ERROR

## 2023-01-01 RX ORDER — HEPARIN SODIUM 5000 [USP'U]/ML
5000 INJECTION, SOLUTION INTRAVENOUS; SUBCUTANEOUS EVERY 8 HOURS
Status: DISCONTINUED | OUTPATIENT
Start: 2023-01-01 | End: 2023-01-01 | Stop reason: HOSPADM

## 2023-01-01 RX ORDER — FLUTICASONE FUROATE AND VILANTEROL TRIFENATATE 200; 25 UG/1; UG/1
POWDER RESPIRATORY (INHALATION)
COMMUNITY
Start: 2023-01-01

## 2023-01-01 RX ADMIN — ALPRAZOLAM 0.5 MG: 0.5 TABLET ORAL at 21:34

## 2023-01-01 RX ADMIN — MORPHINE SULFATE 5 MG: 100 SOLUTION ORAL at 19:57

## 2023-01-01 RX ADMIN — SENNOSIDES AND DOCUSATE SODIUM 2 TABLET: 50; 8.6 TABLET ORAL at 21:35

## 2023-01-01 RX ADMIN — CYANOCOBALAMIN TAB 500 MCG 500 MCG: 500 TAB at 08:52

## 2023-01-01 RX ADMIN — HEPARIN SODIUM 5000 UNITS: 5000 INJECTION, SOLUTION INTRAVENOUS; SUBCUTANEOUS at 21:35

## 2023-01-01 RX ADMIN — CYANOCOBALAMIN TAB 500 MCG 500 MCG: 500 TAB at 09:02

## 2023-01-01 RX ADMIN — ALPRAZOLAM 0.5 MG: 0.5 TABLET ORAL at 20:56

## 2023-01-01 RX ADMIN — Medication 1 TABLET: at 11:13

## 2023-01-01 RX ADMIN — TIOTROPIUM BROMIDE INHALATION SPRAY 2 PUFF: 3.12 SPRAY, METERED RESPIRATORY (INHALATION) at 07:56

## 2023-01-01 RX ADMIN — FLUTICASONE FUROATE AND VILANTEROL TRIFENATATE 1 PUFF: 200; 25 POWDER RESPIRATORY (INHALATION) at 07:47

## 2023-01-01 RX ADMIN — Medication 2 L/MIN: at 07:00

## 2023-01-01 RX ADMIN — Medication 1 TABLET: at 16:56

## 2023-01-01 RX ADMIN — HEPARIN SODIUM 5000 UNITS: 5000 INJECTION, SOLUTION INTRAVENOUS; SUBCUTANEOUS at 22:00

## 2023-01-01 RX ADMIN — CAPSAICIN 1 APPLICATION: 0.25 CREAM TOPICAL at 12:51

## 2023-01-01 RX ADMIN — Medication 2 L/MIN: at 15:00

## 2023-01-01 RX ADMIN — OXYCODONE HYDROCHLORIDE AND ACETAMINOPHEN 500 MG: 500 TABLET ORAL at 09:02

## 2023-01-01 RX ADMIN — FLUTICASONE FUROATE AND VILANTEROL TRIFENATATE 1 PUFF: 200; 25 POWDER RESPIRATORY (INHALATION) at 07:56

## 2023-01-01 RX ADMIN — FOLIC ACID 1 MG: 1 TABLET ORAL at 09:02

## 2023-01-01 RX ADMIN — CAPSAICIN 1 APPLICATION: 0.25 CREAM TOPICAL at 06:17

## 2023-01-01 RX ADMIN — HEPARIN SODIUM 5000 UNITS: 5000 INJECTION, SOLUTION INTRAVENOUS; SUBCUTANEOUS at 14:08

## 2023-01-01 RX ADMIN — CAPSAICIN 1 APPLICATION: 0.25 CREAM TOPICAL at 12:41

## 2023-01-01 RX ADMIN — FOLIC ACID 1 MG: 1 TABLET ORAL at 09:13

## 2023-01-01 RX ADMIN — ALPRAZOLAM 0.5 MG: 0.5 TABLET ORAL at 00:03

## 2023-01-01 RX ADMIN — FLUTICASONE FUROATE AND VILANTEROL TRIFENATATE 1 PUFF: 200; 25 POWDER RESPIRATORY (INHALATION) at 09:13

## 2023-01-01 RX ADMIN — HEPARIN SODIUM 5000 UNITS: 5000 INJECTION, SOLUTION INTRAVENOUS; SUBCUTANEOUS at 06:06

## 2023-01-01 RX ADMIN — FOLIC ACID 1 MG: 1 TABLET ORAL at 09:33

## 2023-01-01 RX ADMIN — HEPARIN SODIUM 5000 UNITS: 5000 INJECTION, SOLUTION INTRAVENOUS; SUBCUTANEOUS at 21:34

## 2023-01-01 RX ADMIN — FERROUS SULFATE TAB 325 MG (65 MG ELEMENTAL FE) 65 MG OF IRON: 325 (65 FE) TAB at 09:34

## 2023-01-01 RX ADMIN — MORPHINE SULFATE 5 MG: 100 SOLUTION ORAL at 16:39

## 2023-01-01 RX ADMIN — CAPSAICIN 1 APPLICATION: 0.25 CREAM TOPICAL at 21:41

## 2023-01-01 RX ADMIN — PSYLLIUM HUSK 1 PACKET: 3.4 POWDER ORAL at 09:04

## 2023-01-01 RX ADMIN — ACETAMINOPHEN 325 MG: 325 TABLET, FILM COATED ORAL at 09:01

## 2023-01-01 RX ADMIN — Medication: at 23:00

## 2023-01-01 RX ADMIN — CAPSAICIN 1 APPLICATION: 0.25 CREAM TOPICAL at 21:35

## 2023-01-01 RX ADMIN — HEPARIN SODIUM 5000 UNITS: 5000 INJECTION, SOLUTION INTRAVENOUS; SUBCUTANEOUS at 20:59

## 2023-01-01 RX ADMIN — MORPHINE SULFATE 5 MG: 100 SOLUTION ORAL at 20:45

## 2023-01-01 RX ADMIN — TIOTROPIUM BROMIDE INHALATION SPRAY 2 PUFF: 3.12 SPRAY, METERED RESPIRATORY (INHALATION) at 07:35

## 2023-01-01 RX ADMIN — Medication 1 TABLET: at 12:41

## 2023-01-01 RX ADMIN — Medication 1 TABLET: at 09:13

## 2023-01-01 RX ADMIN — Medication 1 TABLET: at 09:02

## 2023-01-01 RX ADMIN — Medication 1 TABLET: at 12:51

## 2023-01-01 RX ADMIN — MORPHINE SULFATE 5 MG: 100 SOLUTION ORAL at 16:43

## 2023-01-01 RX ADMIN — HEPARIN SODIUM 5000 UNITS: 5000 INJECTION, SOLUTION INTRAVENOUS; SUBCUTANEOUS at 06:16

## 2023-01-01 RX ADMIN — Medication: at 07:00

## 2023-01-01 RX ADMIN — Medication 1 TABLET: at 16:36

## 2023-01-01 RX ADMIN — Medication: at 21:21

## 2023-01-01 RX ADMIN — HEPARIN SODIUM 5000 UNITS: 5000 INJECTION, SOLUTION INTRAVENOUS; SUBCUTANEOUS at 05:26

## 2023-01-01 RX ADMIN — TIOTROPIUM BROMIDE INHALATION SPRAY 2 PUFF: 3.12 SPRAY, METERED RESPIRATORY (INHALATION) at 07:47

## 2023-01-01 RX ADMIN — ALBUTEROL SULFATE 2 PUFF: 90 AEROSOL, METERED RESPIRATORY (INHALATION) at 09:13

## 2023-01-01 RX ADMIN — HEPARIN SODIUM 5000 UNITS: 5000 INJECTION, SOLUTION INTRAVENOUS; SUBCUTANEOUS at 05:10

## 2023-01-01 RX ADMIN — Medication 1 TABLET: at 08:52

## 2023-01-01 RX ADMIN — CAPSAICIN 1 APPLICATION: 0.25 CREAM TOPICAL at 05:26

## 2023-01-01 RX ADMIN — Medication: at 08:47

## 2023-01-01 RX ADMIN — OXYCODONE HYDROCHLORIDE AND ACETAMINOPHEN 500 MG: 500 TABLET ORAL at 11:13

## 2023-01-01 RX ADMIN — MORPHINE SULFATE 5 MG: 100 SOLUTION ORAL at 21:18

## 2023-01-01 RX ADMIN — CYANOCOBALAMIN TAB 500 MCG 500 MCG: 500 TAB at 09:13

## 2023-01-01 RX ADMIN — MORPHINE SULFATE 5 MG: 100 SOLUTION ORAL at 11:23

## 2023-01-01 RX ADMIN — Medication: at 15:00

## 2023-01-01 RX ADMIN — MORPHINE SULFATE 3 MG: 100 SOLUTION ORAL at 23:53

## 2023-01-01 RX ADMIN — HEPARIN SODIUM 5000 UNITS: 5000 INJECTION, SOLUTION INTRAVENOUS; SUBCUTANEOUS at 16:36

## 2023-01-01 RX ADMIN — TIOTROPIUM BROMIDE INHALATION SPRAY 2 PUFF: 3.12 SPRAY, METERED RESPIRATORY (INHALATION) at 09:16

## 2023-01-01 RX ADMIN — FERROUS SULFATE TAB 325 MG (65 MG ELEMENTAL FE) 65 MG OF IRON: 325 (65 FE) TAB at 08:36

## 2023-01-01 RX ADMIN — FOLIC ACID 1 MG: 1 TABLET ORAL at 08:37

## 2023-01-01 RX ADMIN — ALPRAZOLAM 0.5 MG: 0.5 TABLET ORAL at 21:44

## 2023-01-01 RX ADMIN — CAPSAICIN 1 APPLICATION: 0.25 CREAM TOPICAL at 13:36

## 2023-01-01 RX ADMIN — MORPHINE SULFATE 5 MG: 100 SOLUTION ORAL at 13:33

## 2023-01-01 RX ADMIN — Medication 2 L/MIN: at 08:40

## 2023-01-01 RX ADMIN — FLUTICASONE FUROATE AND VILANTEROL TRIFENATATE 1 PUFF: 200; 25 POWDER RESPIRATORY (INHALATION) at 08:40

## 2023-01-01 RX ADMIN — Medication 1 TABLET: at 17:11

## 2023-01-01 RX ADMIN — FLUTICASONE FUROATE AND VILANTEROL TRIFENATATE 1 PUFF: 200; 25 POWDER RESPIRATORY (INHALATION) at 07:35

## 2023-01-01 RX ADMIN — ALPRAZOLAM 0.5 MG: 0.5 TABLET ORAL at 20:01

## 2023-01-01 RX ADMIN — CAPSAICIN 1 APPLICATION: 0.25 CREAM TOPICAL at 20:55

## 2023-01-01 RX ADMIN — FERROUS SULFATE TAB 325 MG (65 MG ELEMENTAL FE) 65 MG OF IRON: 325 (65 FE) TAB at 09:03

## 2023-01-01 RX ADMIN — PSYLLIUM HUSK 1 PACKET: 3.4 POWDER ORAL at 08:52

## 2023-01-01 RX ADMIN — HEPARIN SODIUM 5000 UNITS: 5000 INJECTION, SOLUTION INTRAVENOUS; SUBCUTANEOUS at 13:31

## 2023-01-01 RX ADMIN — OXYCODONE HYDROCHLORIDE AND ACETAMINOPHEN 500 MG: 500 TABLET ORAL at 09:13

## 2023-01-01 RX ADMIN — PSYLLIUM HUSK 1 PACKET: 3.4 POWDER ORAL at 09:00

## 2023-01-01 RX ADMIN — FOLIC ACID 1 MG: 1 TABLET ORAL at 11:14

## 2023-01-01 RX ADMIN — TIOTROPIUM BROMIDE INHALATION SPRAY 2 PUFF: 3.12 SPRAY, METERED RESPIRATORY (INHALATION) at 08:37

## 2023-01-01 RX ADMIN — FERROUS SULFATE TAB 325 MG (65 MG ELEMENTAL FE) 65 MG OF IRON: 325 (65 FE) TAB at 09:13

## 2023-01-01 SDOH — ECONOMIC STABILITY: FOOD INSECURITY: WITHIN THE PAST 12 MONTHS, THE FOOD YOU BOUGHT JUST DIDN'T LAST AND YOU DIDN'T HAVE MONEY TO GET MORE.: NEVER TRUE

## 2023-01-01 SDOH — ECONOMIC STABILITY: FOOD INSECURITY: WITHIN THE PAST 12 MONTHS, YOU WORRIED THAT YOUR FOOD WOULD RUN OUT BEFORE YOU GOT MONEY TO BUY MORE.: NEVER TRUE

## 2023-01-01 SDOH — SOCIAL STABILITY: SOCIAL INSECURITY: WITHIN THE LAST YEAR, HAVE YOU BEEN HUMILIATED OR EMOTIONALLY ABUSED IN OTHER WAYS BY YOUR PARTNER OR EX-PARTNER?: NO

## 2023-01-01 SDOH — SOCIAL STABILITY: SOCIAL INSECURITY: HAVE YOU HAD THOUGHTS OF HARMING ANYONE ELSE?: NO

## 2023-01-01 SDOH — SOCIAL STABILITY: SOCIAL INSECURITY: WITHIN THE LAST YEAR, HAVE YOU BEEN AFRAID OF YOUR PARTNER OR EX-PARTNER?: NO

## 2023-01-01 SDOH — ECONOMIC STABILITY: INCOME INSECURITY: IN THE LAST 12 MONTHS, WAS THERE A TIME WHEN YOU WERE NOT ABLE TO PAY THE MORTGAGE OR RENT ON TIME?: YES

## 2023-01-01 SDOH — HEALTH STABILITY: MENTAL HEALTH: HOW OFTEN DO YOU HAVE 6 OR MORE DRINKS ON ONE OCCASION?: NEVER

## 2023-01-01 SDOH — ECONOMIC STABILITY: HOUSING INSECURITY
IN THE LAST 12 MONTHS, WAS THERE A TIME WHEN YOU DID NOT HAVE A STEADY PLACE TO SLEEP OR SLEPT IN A SHELTER (INCLUDING NOW)?: NO

## 2023-01-01 SDOH — SOCIAL STABILITY: SOCIAL INSECURITY
WITHIN THE LAST YEAR, HAVE TO BEEN RAPED OR FORCED TO HAVE ANY KIND OF SEXUAL ACTIVITY BY YOUR PARTNER OR EX-PARTNER?: NO

## 2023-01-01 SDOH — SOCIAL STABILITY: SOCIAL INSECURITY: DO YOU FEEL ANYONE HAS EXPLOITED OR TAKEN ADVANTAGE OF YOU FINANCIALLY OR OF YOUR PERSONAL PROPERTY?: NO

## 2023-01-01 SDOH — HEALTH STABILITY: MENTAL HEALTH: HOW OFTEN DO YOU HAVE A DRINK CONTAINING ALCOHOL?: NEVER

## 2023-01-01 SDOH — SOCIAL STABILITY: SOCIAL INSECURITY: ARE YOU OR HAVE YOU BEEN THREATENED OR ABUSED PHYSICALLY, EMOTIONALLY, OR SEXUALLY BY ANYONE?: NO

## 2023-01-01 SDOH — SOCIAL STABILITY: SOCIAL NETWORK
DO YOU BELONG TO ANY CLUBS OR ORGANIZATIONS SUCH AS CHURCH GROUPS UNIONS, FRATERNAL OR ATHLETIC GROUPS, OR SCHOOL GROUPS?: NO

## 2023-01-01 SDOH — SOCIAL STABILITY: SOCIAL NETWORK: IN A TYPICAL WEEK, HOW MANY TIMES DO YOU TALK ON THE PHONE WITH FAMILY, FRIENDS, OR NEIGHBORS?: TWICE A WEEK

## 2023-01-01 SDOH — HEALTH STABILITY: MENTAL HEALTH: EXPERIENCED ANY OF THE FOLLOWING LIFE EVENTS: DEATH OF FAMILY/FRIEND

## 2023-01-01 SDOH — ECONOMIC STABILITY: INCOME INSECURITY: HOW HARD IS IT FOR YOU TO PAY FOR THE VERY BASICS LIKE FOOD, HOUSING, MEDICAL CARE, AND HEATING?: SOMEWHAT HARD

## 2023-01-01 SDOH — SOCIAL STABILITY: SOCIAL NETWORK: ARE YOU MARRIED, WIDOWED, DIVORCED, SEPARATED, NEVER MARRIED, OR LIVING WITH A PARTNER?: PATIENT DECLINED

## 2023-01-01 SDOH — ECONOMIC STABILITY: TRANSPORTATION INSECURITY
IN THE PAST 12 MONTHS, HAS THE LACK OF TRANSPORTATION KEPT YOU FROM MEDICAL APPOINTMENTS OR FROM GETTING MEDICATIONS?: NO

## 2023-01-01 SDOH — HEALTH STABILITY: PHYSICAL HEALTH: ON AVERAGE, HOW MANY DAYS PER WEEK DO YOU ENGAGE IN MODERATE TO STRENUOUS EXERCISE (LIKE A BRISK WALK)?: 0 DAYS

## 2023-01-01 SDOH — HEALTH STABILITY: MENTAL HEALTH: HOW MANY STANDARD DRINKS CONTAINING ALCOHOL DO YOU HAVE ON A TYPICAL DAY?: PATIENT DOES NOT DRINK

## 2023-01-01 SDOH — SOCIAL STABILITY: SOCIAL NETWORK: HOW OFTEN DO YOU ATTENT MEETINGS OF THE CLUB OR ORGANIZATION YOU BELONG TO?: NEVER

## 2023-01-01 SDOH — SOCIAL STABILITY: SOCIAL NETWORK: HOW OFTEN DO YOU GET TOGETHER WITH FRIENDS OR RELATIVES?: NEVER

## 2023-01-01 SDOH — HEALTH STABILITY: PHYSICAL HEALTH: ON AVERAGE, HOW MANY MINUTES DO YOU ENGAGE IN EXERCISE AT THIS LEVEL?: 0 MIN

## 2023-01-01 SDOH — HEALTH STABILITY: MENTAL HEALTH
STRESS IS WHEN SOMEONE FEELS TENSE, NERVOUS, ANXIOUS, OR CAN'T SLEEP AT NIGHT BECAUSE THEIR MIND IS TROUBLED. HOW STRESSED ARE YOU?: ONLY A LITTLE

## 2023-01-01 SDOH — ECONOMIC STABILITY: TRANSPORTATION INSECURITY
IN THE PAST 12 MONTHS, HAS LACK OF TRANSPORTATION KEPT YOU FROM MEETINGS, WORK, OR FROM GETTING THINGS NEEDED FOR DAILY LIVING?: NO

## 2023-01-01 SDOH — ECONOMIC STABILITY: INCOME INSECURITY: IN THE PAST 12 MONTHS, HAS THE ELECTRIC, GAS, OIL, OR WATER COMPANY THREATENED TO SHUT OFF SERVICE IN YOUR HOME?: NO

## 2023-01-01 SDOH — SOCIAL STABILITY: SOCIAL INSECURITY: HAS ANYONE EVER THREATENED TO HURT YOUR FAMILY OR YOUR PETS?: NO

## 2023-01-01 SDOH — SOCIAL STABILITY: SOCIAL INSECURITY: DO YOU FEEL UNSAFE GOING BACK TO THE PLACE WHERE YOU ARE LIVING?: NO

## 2023-01-01 SDOH — SOCIAL STABILITY: SOCIAL INSECURITY
WITHIN THE LAST YEAR, HAVE YOU BEEN KICKED, HIT, SLAPPED, OR OTHERWISE PHYSICALLY HURT BY YOUR PARTNER OR EX-PARTNER?: NO

## 2023-01-01 SDOH — SOCIAL STABILITY: SOCIAL INSECURITY: WERE YOU ABLE TO COMPLETE ALL THE BEHAVIORAL HEALTH SCREENINGS?: YES

## 2023-01-01 SDOH — SOCIAL STABILITY: SOCIAL NETWORK: HOW OFTEN DO YOU ATTEND CHURCH OR RELIGIOUS SERVICES?: MORE THAN 4 TIMES PER YEAR

## 2023-01-01 SDOH — ECONOMIC STABILITY: HOUSING INSECURITY: IN THE LAST 12 MONTHS, HOW MANY PLACES HAVE YOU LIVED?: 1

## 2023-01-01 SDOH — SOCIAL STABILITY: SOCIAL INSECURITY: ARE THERE ANY APPARENT SIGNS OF INJURIES/BEHAVIORS THAT COULD BE RELATED TO ABUSE/NEGLECT?: NO

## 2023-01-01 SDOH — SOCIAL STABILITY: SOCIAL INSECURITY: ABUSE: ADULT

## 2023-01-01 SDOH — SOCIAL STABILITY: SOCIAL INSECURITY: DOES ANYONE TRY TO KEEP YOU FROM HAVING/CONTACTING OTHER FRIENDS OR DOING THINGS OUTSIDE YOUR HOME?: NO

## 2023-01-01 ASSESSMENT — PAIN SCALES - GENERAL
PAINLEVEL_OUTOF10: 0 - NO PAIN
PAINLEVEL_OUTOF10: 6
PAINLEVEL_OUTOF10: 3
PAINLEVEL_OUTOF10: 0 - NO PAIN
PAINLEVEL_OUTOF10: 5 - MODERATE PAIN
PAINLEVEL_OUTOF10: 0 - NO PAIN
PAINLEVEL_OUTOF10: 0 - NO PAIN
PAINLEVEL_OUTOF10: 7
PAINLEVEL_OUTOF10: 0 - NO PAIN
PAINLEVEL_OUTOF10: 0 - NO PAIN
PAINLEVEL_OUTOF10: 4
PAINLEVEL_OUTOF10: 0 - NO PAIN
PAINLEVEL_OUTOF10: 0 - NO PAIN
PAINLEVEL_OUTOF10: 6
PAINLEVEL_OUTOF10: 0 - NO PAIN
PAINLEVEL_OUTOF10: 5 - MODERATE PAIN
PAINLEVEL_OUTOF10: 5 - MODERATE PAIN
PAINLEVEL_OUTOF10: 2
PAINLEVEL_OUTOF10: 8
PAINLEVEL_OUTOF10: 5 - MODERATE PAIN
PAINLEVEL_OUTOF10: 0 - NO PAIN

## 2023-01-01 ASSESSMENT — ACTIVITIES OF DAILY LIVING (ADL)
GROOMING: INDEPENDENT
ADL_ASSISTANCE: INDEPENDENT
PATIENT'S MEMORY ADEQUATE TO SAFELY COMPLETE DAILY ACTIVITIES?: YES
FEEDING YOURSELF: INDEPENDENT
JUDGMENT_ADEQUATE_SAFELY_COMPLETE_DAILY_ACTIVITIES: YES
TOILETING: INDEPENDENT
DRESSING YOURSELF: INDEPENDENT
GROOMING: INDEPENDENT
ADEQUATE_TO_COMPLETE_ADL: YES
WALKS IN HOME: INDEPENDENT
WALKS IN HOME: INDEPENDENT
DRESSING YOURSELF: INDEPENDENT
TOILETING: INDEPENDENT
HEARING - RIGHT EAR: FUNCTIONAL
HEARING - LEFT EAR: FUNCTIONAL
BATHING: INDEPENDENT
HEARING - RIGHT EAR: FUNCTIONAL
PATIENT'S MEMORY ADEQUATE TO SAFELY COMPLETE DAILY ACTIVITIES?: YES
BATHING: INDEPENDENT
GROOMING: INDEPENDENT
JUDGMENT_ADEQUATE_SAFELY_COMPLETE_DAILY_ACTIVITIES: YES
HEARING - LEFT EAR: FUNCTIONAL
PATIENT'S MEMORY ADEQUATE TO SAFELY COMPLETE DAILY ACTIVITIES?: YES
HEARING - RIGHT EAR: FUNCTIONAL
DRESSING YOURSELF: INDEPENDENT
BATHING: INDEPENDENT
JUDGMENT_ADEQUATE_SAFELY_COMPLETE_DAILY_ACTIVITIES: YES
FEEDING YOURSELF: INDEPENDENT
BATHING: INDEPENDENT
ADEQUATE_TO_COMPLETE_ADL: YES
WALKS IN HOME: INDEPENDENT
JUDGMENT_ADEQUATE_SAFELY_COMPLETE_DAILY_ACTIVITIES: YES
HEARING - RIGHT EAR: FUNCTIONAL
DRESSING YOURSELF: INDEPENDENT
TOILETING: INDEPENDENT
HEARING - LEFT EAR: FUNCTIONAL
HEARING - LEFT EAR: FUNCTIONAL
FEEDING YOURSELF: INDEPENDENT
GROOMING: INDEPENDENT
ADEQUATE_TO_COMPLETE_ADL: YES
FEEDING YOURSELF: INDEPENDENT
ADEQUATE_TO_COMPLETE_ADL: YES
BATHING_ASSISTANCE: MINIMAL
TOILETING: INDEPENDENT
WALKS IN HOME: INDEPENDENT
PATIENT'S MEMORY ADEQUATE TO SAFELY COMPLETE DAILY ACTIVITIES?: YES

## 2023-01-01 ASSESSMENT — ENCOUNTER SYMPTOMS
WEAKNESS: 1
DIAPHORESIS: 0
SHORTNESS OF BREATH: 1
CHEST TIGHTNESS: 0
PALPITATIONS: 0
FATIGUE: 1
FEVER: 0

## 2023-01-01 ASSESSMENT — COGNITIVE AND FUNCTIONAL STATUS - GENERAL
DRESSING REGULAR LOWER BODY CLOTHING: A LITTLE
CLIMB 3 TO 5 STEPS WITH RAILING: A LITTLE
WALKING IN HOSPITAL ROOM: A LITTLE
TOILETING: A LITTLE
MOBILITY SCORE: 20
DAILY ACTIVITIY SCORE: 22
DRESSING REGULAR UPPER BODY CLOTHING: A LITTLE
PERSONAL GROOMING: A LITTLE
TOILETING: A LITTLE
STANDING UP FROM CHAIR USING ARMS: A LITTLE
WALKING IN HOSPITAL ROOM: A LITTLE
DAILY ACTIVITIY SCORE: 24
STANDING UP FROM CHAIR USING ARMS: A LITTLE
CLIMB 3 TO 5 STEPS WITH RAILING: A LITTLE
MOVING TO AND FROM BED TO CHAIR: A LITTLE
TOILETING: A LITTLE
CLIMB 3 TO 5 STEPS WITH RAILING: A LITTLE
DAILY ACTIVITIY SCORE: 22
WALKING IN HOSPITAL ROOM: A LITTLE
HELP NEEDED FOR BATHING: A LITTLE
DRESSING REGULAR UPPER BODY CLOTHING: A LITTLE
MOBILITY SCORE: 20
STANDING UP FROM CHAIR USING ARMS: A LITTLE
PATIENT BASELINE BEDBOUND: YES
CLIMB 3 TO 5 STEPS WITH RAILING: A LITTLE
DRESSING REGULAR UPPER BODY CLOTHING: A LITTLE
MOBILITY SCORE: 23
MOVING TO AND FROM BED TO CHAIR: A LITTLE
MOVING TO AND FROM BED TO CHAIR: A LITTLE
MOBILITY SCORE: 20
DAILY ACTIVITIY SCORE: 19

## 2023-01-01 ASSESSMENT — PAIN - FUNCTIONAL ASSESSMENT
PAIN_FUNCTIONAL_ASSESSMENT: FLACC (FACE, LEGS, ACTIVITY, CRY, CONSOLABILITY)
PAIN_FUNCTIONAL_ASSESSMENT: 0-10
PAIN_FUNCTIONAL_ASSESSMENT: FLACC (FACE, LEGS, ACTIVITY, CRY, CONSOLABILITY)
PAIN_FUNCTIONAL_ASSESSMENT: 0-10
PAIN_FUNCTIONAL_ASSESSMENT: FLACC (FACE, LEGS, ACTIVITY, CRY, CONSOLABILITY)
PAIN_FUNCTIONAL_ASSESSMENT: 0-10
PAIN_FUNCTIONAL_ASSESSMENT: 0-10

## 2023-01-01 ASSESSMENT — LIFESTYLE VARIABLES
HOW OFTEN DO YOU HAVE A DRINK CONTAINING ALCOHOL: MONTHLY OR LESS
SKIP TO QUESTIONS 9-10: 1
AUDIT-C TOTAL SCORE: 0
SKIP TO QUESTIONS 9-10: 0
AUDIT-C TOTAL SCORE: 0
AUDIT-C TOTAL SCORE: 0
SUBSTANCE_ABUSE_PAST_12_MONTHS: NO
PRESCIPTION_ABUSE_PAST_12_MONTHS: NO
SKIP TO QUESTIONS 9-10: 1
HOW MANY STANDARD DRINKS CONTAINING ALCOHOL DO YOU HAVE ON A TYPICAL DAY: PATIENT DOES NOT DRINK
AUDIT-C TOTAL SCORE: 2
SUBSTANCE_ABUSE_PAST_12_MONTHS: NO
AUDIT-C TOTAL SCORE: 0
SKIP TO QUESTIONS 9-10: 1
HOW MANY STANDARD DRINKS CONTAINING ALCOHOL DO YOU HAVE ON A TYPICAL DAY: PATIENT DOES NOT DRINK
HOW OFTEN DO YOU HAVE A DRINK CONTAINING ALCOHOL: NEVER
HOW OFTEN DO YOU HAVE 6 OR MORE DRINKS ON ONE OCCASION: LESS THAN MONTHLY
PRESCIPTION_ABUSE_PAST_12_MONTHS: NO
HOW OFTEN DO YOU HAVE 6 OR MORE DRINKS ON ONE OCCASION: NEVER
AUDIT-C TOTAL SCORE: 2

## 2023-01-01 ASSESSMENT — PAIN DESCRIPTION - PROGRESSION: CLINICAL_PROGRESSION: NOT CHANGED

## 2023-01-01 ASSESSMENT — COLUMBIA-SUICIDE SEVERITY RATING SCALE - C-SSRS
6. HAVE YOU EVER DONE ANYTHING, STARTED TO DO ANYTHING, OR PREPARED TO DO ANYTHING TO END YOUR LIFE?: NO
2. HAVE YOU ACTUALLY HAD ANY THOUGHTS OF KILLING YOURSELF?: NO
1. IN THE PAST MONTH, HAVE YOU WISHED YOU WERE DEAD OR WISHED YOU COULD GO TO SLEEP AND NOT WAKE UP?: NO

## 2023-01-01 ASSESSMENT — PATIENT HEALTH QUESTIONNAIRE - PHQ9
2. FEELING DOWN, DEPRESSED OR HOPELESS: NEARLY EVERY DAY
1. LITTLE INTEREST OR PLEASURE IN DOING THINGS: NEARLY EVERY DAY
SUM OF ALL RESPONSES TO PHQ9 QUESTIONS 1 & 2: 6
2. FEELING DOWN, DEPRESSED OR HOPELESS: NEARLY EVERY DAY
1. LITTLE INTEREST OR PLEASURE IN DOING THINGS: NEARLY EVERY DAY
SUM OF ALL RESPONSES TO PHQ9 QUESTIONS 1 & 2: 6

## 2023-01-01 ASSESSMENT — PAIN SCALES - WONG BAKER: WONGBAKER_NUMERICALRESPONSE: HURTS LITTLE BIT

## 2023-09-27 PROBLEM — C34.91: Status: ACTIVE | Noted: 2023-01-01

## 2023-10-01 PROBLEM — R06.00 DYSPNEA: Status: ACTIVE | Noted: 2023-01-01

## 2023-10-01 PROBLEM — J93.9 PNEUMOTHORAX: Status: ACTIVE | Noted: 2023-01-01

## 2023-10-01 NOTE — ED PROVIDER NOTES
HPI   Chief Complaint   Patient presents with    Shortness of Breath     Pt presents to shortness of breathing since this morning, pt states that this has been happening for months, gradual onset, pmh lung cancer states she is being treated for fluid on the lungs, no chest pain, no O2 use at home, O2 levels okay at home per daughter        78-year-old female with lung cancer and a hydropneumothorax presents ER with increased shortness of breath.  Patient has had shortness of breath for several months.  She has a Pleurx catheter that was drained 3 times a week and then recently twice a week.  Required admission for draining this week and was discharged on Friday.  Shortness of breath increased yesterday Saturday.  No chest pain.  No cough or wheezing.  No fever.  No nausea vomiting or diarrhea.  No abdominal pain.    From patient and daughter.  History limited from patient due to age.      History provided by:  Patient and relative                      Seanor Coma Scale Score: 15                  Patient History   No past medical history on file.  No past surgical history on file.  No family history on file.  Social History     Tobacco Use    Smoking status: Not on file    Smokeless tobacco: Not on file   Substance Use Topics    Alcohol use: Not on file    Drug use: Not on file       Physical Exam   ED Triage Vitals   Temp Heart Rate Resp BP   09/30/23 1948 09/30/23 1948 09/30/23 1948 09/30/23 1948   36.9 °C (98.4 °F) 99 17 112/75      SpO2 Temp Source Heart Rate Source Patient Position   09/30/23 1948 09/30/23 1948 10/01/23 0026 --   94 % Temporal Monitor       BP Location FiO2 (%)     -- --             Physical Exam  Vitals and nursing note reviewed.     Constitutional: Frail awake & alert. No acute distress.  Head: Atraumatic.  Eyes: Sclerae are anicteric and not injected.  ENT: Airway is patent.  Neck: Neck is supple and non-tender. No stridor.  Cardiovascular: Regular rate.  Pulmonary/Chest: Decreased breath  sounds on the right.  Pleurx catheter in the posterior right back.  Mild tachypnea.  No hypoxia  GI: Soft and non-distended. There is no discomfort with palpation.   Extremities: Full range of motion in all extremities and no deformity.  No pedal edema  Neurological: Alert, awake.  Moving all extremities.  Skin: Skin is warm and dry.  Psychiatric: Cooperative.    ED Course & MDM   Diagnoses as of 10/01/23 0842   Shortness of breath   Hydropneumothorax       Medical Decision Making  Shortness of breath.  Patient admitted for acute on chronic shortness of breath.  Patient has a hydropneumothorax.  It is usually drained 2 or 3 times a week.  Drained on Friday.  Increase shortness of breath since it was drained.  Chest x-ray does not show any acute airspace disease.  Shows increased fluid in the hydropneumothorax.  Drained approximately 500 mils of fluid from the Pleurx catheter.'    Woke to thoracic surgery who recommended draining the Pleurx catheter.  Repeat x-ray after draining approximate 500 mL of fluid that showed decreased pleural fluid but no change in a hydropneumothorax.    Signs of acute infection.  No signs of metabolic or electrolyte abnormalities.  Reviewed labs.    Patient admitted by Dr. Parmer to general medical floor ([facility])          Procedure  Procedures    Hooked up vacuum bottles to pleural catheter.  Drained 500 mL fluid.  Replaced And dressing.  Patient tolerated procedure well       Will Ivey MD  10/01/23 4891

## 2023-10-01 NOTE — NURSING NOTE
Pt refusing medications states I only take these 2 meds piece of paper with folic acid and ferrous sulfate advil as needed

## 2023-10-01 NOTE — NURSING NOTE
"Pt has arrived to floor  oriented to room vitals taken  admission in progress  pt reports \"When is someone going to help me with my breathing\"  Respiratory at bedside  pt sitting up and tells therapy she feels a little better\"  Does not appear to be short of breath and 95 % room air Pt reported no better after she was sent home on Friday  Telling Respirator therapist the inhalers dont seem to work   Pt encouraged to try them do to reporting not feeling any better   "

## 2023-10-01 NOTE — ED NOTES
Assisted patient to bathroom for void.  Pt's gait is steady, unassisted.  \Margaux Bhakta, WESTLEY Bhakta RN  10/01/23 0919

## 2023-10-01 NOTE — ED NOTES
Right posterior flank pleuritic drain noted . Dressing dry and intact.   Currently draining serosanguinous fluid 150ml noted. Negative suction  Discontinued and capped by Dr. Gallagher. Clear dressing applied.  Margaux Bhakta, RN       Margaux Bhakta RN  10/01/23 0754

## 2023-10-01 NOTE — NURSING NOTE
"Dr Mckeon with discussion about shortness of breath related to expanding  pt unable to grasp the significance of the cancer and being short of breath  voices that \"noone is doing anything for my breathing\"  support given  Pt up to brp   "

## 2023-10-02 PROBLEM — R04.2 COUGH WITH HEMOPTYSIS: Status: ACTIVE | Noted: 2023-01-01

## 2023-10-02 NOTE — CONSULTS
"      Reason For Consult  SOB/Pneumothorax    History Of Present Illness  Chika Ford is a 78 y.o. female presenting with increasing SOB. She was seen in the ER on 9/28/23 She had 1600 ml drained from the right thorax via pleurX cath. 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 Another 500cc was drained.Review of the cxr shows minimal right pleural effusion. She denies fever,chills,chest pain or cough.     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    Surgical History  She has no past surgical history on file.     Social History  + tobacco abuse    Family History  No family history on file.     Allergies  Penicillins    Review of Systems   Constitutional:  Positive for fatigue. Negative for diaphoresis and fever.   Respiratory:  Positive for shortness of breath. Negative for chest tightness.    Cardiovascular:  Negative for chest pain and palpitations.   Neurological:  Positive for weakness. Negative for syncope.        Physical exam:  Chest examination-lungs are very diminished but clear.  Absent breath sounds on the right.    Heart sounds are normal S1 and S2.  No murmurs rubs or gallops. Abdomen-soft and nontender without hepatosplenomegaly, no rebound, no distention.  Normoactive bowel sounds  Extremities-no calf tenderness, cyanosis clubbing, or edema  musculoskeletal-no joint effusion or swelling.                  Neurologic-awake and alert, moves all extremities symmetrically, follows commands appropriately      Last Recorded Vitals  Blood pressure 129/65, pulse 92, temperature 36.8 °C (98.2 °F), temperature source Oral, resp. rate 18, height 1.651 m (5' 5\"), weight 50.8 kg (112 lb), SpO2 99 %.    Relevant Results  CXR right " hydropneumothorax. Slightly better than previous film     Assessment/Plan   Pt with malignant pleural effusion/trapped lung. Continue to drain M-W-F  Surgery not indicated.   Consider hospice care.  Discussed with   Discussed with Rebecca Collado and     I spent 30 minutes in the professional and overall care of this patient.    Barbie Santamaria PAC

## 2023-10-02 NOTE — CONSULTS
Reason For Consult  Shortness of breath,    History Of Present Illness  Chika Ford is a 78 y.o. female presenting with worsening shortness of breath.  Patient has a known malignant pleural effusion and has a Pleurx catheter placed.  Patient was just recently discharged from our facility on 9/29 after returning now to the emergency room.  No cough or sputum production.  No chest pain.  No fevers chills or sweats.  In the emergency room 500 mL of fluid was drawn with no significant improvement in her dyspnea.  Day prior she had 750 cc drawn by the home visiting nurse.  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    Surgical History  She has no past surgical history on file.     Continued smoker    Family History  No family history on file.     Allergies  Penicillins    Review of Systems  Noncontributory     Physical Exam  1. vital signs -see above  2. general appearance -nearly cachectic thin frail woman lying in bed in no acute distress  3. Skin -warm and dry, no rash or diaphoresis  4. HEENT-conjunctiva are pink.  Sclerae are anicteric.  Nares are unremarkable.  Pharynx is unremarkable  5. Neck-supple.  No JVP.  No thyromegaly.  6. Adenopathy-no peripheral adenopathy appreciable  7. Chest examination-lungs are very diminished but clear.  Absent breath sounds on the right.    8. Heart sounds are normal S1 and S2.  No murmurs rubs or gallops.  9. Abdomen-soft and nontender without hepatosplenomegaly, no rebound, no distention.  Normoactive bowel sounds  10. Extremities-no calf tenderness, cyanosis clubbing, or edema  11. musculoskeletal-no joint effusion or swelling.  12. Neurologic-awake and alert, moves all extremities symmetrically, follows  "commands appropriately        Last Recorded Vitals  Blood pressure 127/53, pulse 74, temperature 36.6 °C (97.9 °F), temperature source Oral, resp. rate 19, height 1.651 m (5' 5\"), weight 50.8 kg (112 lb), SpO2 100 %.    Relevant Results  Chest x-ray was personally reviewed.  Right hydropneumothorax  Serum electrolytes were reviewed and are unremarkable.  CBC shows a stable modest anemia at 10.2 with normal white count and normal platelet count     Assessment/Plan     1.  Hydropneumothorax  2.  Malignant pleural effusion, status post Pleurx catheter  3.  Metastatic non-small cell carcinoma    -Patient has a trapped lung and her dyspnea I doubt we will elizabeth because of that.  Fluid will continue to reaccumulate because nature abhors a vacuum  -Feel this patient should be hospice and comfort care  -Case discussed with both thoracic surgery and Rebecca Collado admitting CNP  -We will discuss with the patient as well    Thank you          Ciro Ruelas MD    "

## 2023-10-02 NOTE — H&P
History Of Present Illness  Chika Ford is a 78 y.o. female presenting with shortness of breath.  Patient is 78-year-old  female with a history of lung cancer with pleural effusion status post uterus catheter placed and thoracentesis 3 times a week was in the hospital a day prior to this admission.  Patient was in the hospital for shortness of breath and was found to have malfunctioning Pleurx catheter.  Patient was seen by cardiothoracic surgery and 1500 mL fluid was removed.  Patient was sent home in a stable condition.  But patient continued to get shortness of breath at home and came back and has been admitted to the floor for further management.  Patient denied any nausea or vomiting.  No diarrhea, dysuria, hematuria epigastric.  Patient complained of mild cough but no expectoration.  No fever chills or rigor.     Past Medical History  Non-small cell cancer of right lung  Recurrent malignant pleural effusion  Osteoarthritis  Osteopenia  Anxiety    Surgical History  Pleurx catheter placement, bronchoscopy and biopsy     Social History  He used to smoke heavily.  No history of alcohol abuse or drug abuse.  Lives alone.  Family History  Nothing significant     Allergies  Penicillins    Review of Systems     Physical Exam  HEENT: Head Oseni atraumatic, no paracolonic, extraocular intact, pupils regular, normal normethadone.  Neck: Supple, no JVP, no palpable adenopathy thyromegaly  Chest: Occasional crackles on the right lung and decreased breath sounds.  Abdominal: Soft, nontender, bowel sounds present, normoactive, no palpable hepatosplenomegaly  Extremities: No edema, pulses present, no cyanosis or clubbing  Last Recorded Vitals  /53 (BP Location: Right arm, Patient Position: Lying)   Pulse 74   Temp 36.6 °C (97.9 °F) (Oral)   Resp 19   Wt 50.8 kg (112 lb)   SpO2 100%     Relevant Results        Results for orders placed or performed during the hospital encounter of 09/30/23 (from the past  24 hour(s))   CBC and Auto Differential   Result Value Ref Range    WBC 6.8 4.4 - 11.3 x10*3/uL    nRBC 0.0 0.0 - 0.0 /100 WBCs    RBC 4.94 4.00 - 5.20 x10*6/uL    Hemoglobin 10.2 (L) 12.0 - 16.0 g/dL    Hematocrit 31.8 (L) 36.0 - 46.0 %    MCV 64 (L) 80 - 100 fL    MCH 20.6 (L) 26.0 - 34.0 pg    MCHC 32.1 32.0 - 36.0 g/dL    RDW 17.4 (H) 11.5 - 14.5 %    Platelets 373 150 - 450 x10*3/uL    MPV 11.3 7.5 - 11.5 fL    Neutrophils % 65.7 40.0 - 80.0 %    Immature Granulocytes %, Automated 0.3 0.0 - 0.9 %    Lymphocytes % 19.5 13.0 - 44.0 %    Monocytes % 10.2 2.0 - 10.0 %    Eosinophils % 3.4 0.0 - 6.0 %    Basophils % 0.9 0.0 - 2.0 %    Neutrophils Absolute 4.49 1.60 - 5.50 x10*3/uL    Immature Granulocytes Absolute, Automated 0.02 0.00 - 0.50 x10*3/uL    Lymphocytes Absolute 1.33 0.80 - 3.00 x10*3/uL    Monocytes Absolute 0.70 0.05 - 0.80 x10*3/uL    Eosinophils Absolute 0.23 0.00 - 0.40 x10*3/uL    Basophils Absolute 0.06 0.00 - 0.10 x10*3/uL   Basic Metabolic Panel   Result Value Ref Range    Glucose 81 65 - 99 mg/dL    Sodium 143 133 - 145 mmol/L    Potassium 3.8 3.4 - 5.1 mmol/L    Chloride 106 97 - 107 mmol/L    Bicarbonate 25 24 - 31 mmol/L    Urea Nitrogen 14 8 - 25 mg/dL    Creatinine 0.80 0.40 - 1.60 mg/dL    eGFR 75 >60 mL/min/1.73m*2    Calcium 9.8 8.5 - 10.4 mg/dL    Anion Gap 12 <=19 mmol/L      XR chest 2 views    Result Date: 10/1/2023  Interpreted By:  Henry Andre, STUDY: XR CHEST 2 VIEWS; 10/1/2023 7:51 am   INDICATION: Signs/Symptoms:SOB, hydropnuemo s/p draining   COMPARISON: 09/30/2023   ACCESSION NUMBER(S): [id number]   ORDERING CLINICIAN: DESTIN GORDON   TECHNIQUE: PA and LAT views of the chest were obtained.   FINDINGS: Again seen is a large right-sided pneumothorax which is not significantly changed. Right PleurX catheter in stable position in the right lung base. Slight interval decrease in degree of right pleural fluid however there is moderate right pleural fluid  remaining. The component of pneumothorax is not significantly changed. Associated atelectatic changes of the right lung, unchanged. The left hemithorax remains clear. No left pleural effusion. The cardiomediastinal silhouette is unremarkable.   The osseous structures are intact.       Slight decreased right pleural fluid, however, the right hydropneumothorax is otherwise not significantly changed.     Signed by: Henry Andre 10/1/2023 8:08 AM Dictation workstation:   [id number]    XR chest 2 views    Result Date: 9/30/2023  Interpreted By:  Henry Andre, STUDY: XR CHEST 2 VIEWS; 9/30/2023 8:45 pm   INDICATION: Signs/Symptoms:sob   COMPARISON: 09/29/2023   ACCESSION NUMBER(S): [id number]   ORDERING CLINICIAN: MIKEL ORTIZ   TECHNIQUE: PA and LAT views of the chest were obtained.   FINDINGS: Again seen is a large right hydropneumothorax. There is a PleurX catheter seen within the right hemithorax. The degree of moderate effusion has not changed however there is interval worsening of large right pneumothorax. There is no significant change in degree of pleural effusion or pneumothorax. The left lung remains clear.   The cardiomediastinal silhouette is within normal limits.       Large right hydropneumothorax. The degree of pneumothorax has slightly worsened since the prior study.     Signed by: Henry Andre 9/30/2023 9:07 PM Dictation workstation:   [id number]    XR chest 1 view    Result Date: 9/29/2023  PROCEDURE:         CHEST PORTABLE - WXR  0018 REASON FOR EXAM: pneumothorax RESULT: MRN: [id number] Patient Name: MOOSE OROZCO STUDY: CHEST PORTABLE; 9/29/2023 9:54 am INDICATION: pneumothorax. COMPARISON: 09/28/2023 ACCESSION NUMBER(S): [id number] ORDERING CLINICIAN: JEF DICKSON TECHNIQUE: 1 view of the chest was performed. FINDINGS: Again seen is a large right hydropneumothorax which is not significantly changed. There is a PleurX catheter seen within the right hemithorax. There is no significant  change in degree of pleural effusion or pneumothorax. The left lung remains clear. The cardiomediastinal silhouette is within normal limits. IMPRESSION: No acute cardiopulmonary disease. Dictation workstation:   [id number] Original Interpreting Physician:   JORDYN FULLER MD Original Transcribed by/Date: MMODAL Sep 29 2023  9:37A Original Electronically Signed by/Date: JORDYN FULLER MD Sep 29 2023 10:18A Addendum Interpreting Physician: Addendum Transcribed by/Date: NO ADDENDUM Addendum Electronically Signed by/Date:     XR chest 2 views    Result Date: 9/28/2023  PROCEDURE:         CHEST 2 VIEW - WXR  0020 REASON FOR EXAM: post-drainage pleurx RESULT: MRN: [id number] Patient Name: MOOSE OROZCO STUDY: CHEST 2 VIEW 9/28/2023 2:33 pm INDICATION: Placement of new PleurX catheter COMPARISON: 09/05/2023 as well as CT examination of the chest done earlier today ACCESSION NUMBER(S): [id number] ORDERING CLINICIAN: MIKEL ORTIZ TECHNIQUE: PA and lateral views of the chest were acquired. FINDINGS: There is a PleurX catheter seen within the right hemithorax with interval reduction in size of the right pleural effusion. However, there is now a rather large right-sided hydropneumothorax seen which I suspect is related to noncompliant right lung. The pneumothorax measures up to 4 cm in thickness superiorly. The left lung is clear with no left-sided pleural abnormality seen. The heart is not enlarged. IMPRESSION: Partial evacuation of the right pleural effusion when compared with CT study done earlier today but there is now a large right hydropneumothorax, most likely due to noncompliant right lung. Document Only: The critical information above was relayed directly by me by telephone to MIKEL ORTIZ on 9/28/2023 at 3:40 pm. Dictation workstation:   [id number] Original Interpreting Physician:   WILL DESAI M.D. Original Transcribed by/Date: [facility] Sep 28 2023  2:05P Original Electronically Signed by/Date: WILL DESAI M.D. Sep  28 2023  3:40P Addendum Interpreting Physician: Addendum Transcribed by/Date: NO ADDENDUM Addendum Electronically Signed by/Date:     CT chest wo IV contrast    Result Date: 9/28/2023  PROCEDURE:         CHEST WO CONTRAST - CCT  3006 REASON FOR EXAM: MALIGNANT NEOPLASM OF UPPER LOBE, RIGHT BRONCHUS OR LUNG RESULT: MRN: [id number] Patient Name: MOOSE OROZCO STUDY: CHEST WO CONTRAST; 9/28/2023 11:05 am INDICATION: MALIGNANT NEOPLASM OF UPPER LOBE, RIGHT BRONCHUS OR LUNG. COMPARISON: 5 September 2023. ACCESSION NUMBER(S): [id number] ORDERING CLINICIAN: ARIANA TORREZ TECHNIQUE: CT was performed with one or more of the following dose reduction techniques: automated exposure control, adjustment of the mA and/or kV according to patient size, or use of iterative reconstruction technique. 2D, multiplanar, reconstructed images are obtained at the time of acquisition. FINDINGS: Thyroid is unremarkable. No supraclavicular adenopathy. No axillary adenopathy. There is a very large right pleural effusion with significant compression of the right lung. Right-sided chest tube is in place without pneumothorax or pneumomediastinum. There is shift of mediastinal structures to the left due to the very large right pleural effusion. Calcified airways are seen without other airway filling defect. No cardiomegaly with moderate to large pericardial effusion becomes larger at the heart base. 6 mm pulmonary nodule anterior left upper lobe image 188 of series 3 is seen with more lateral pulmonary nodule image 187 measuring 5 mm abutting the pleura. 5.7 mm nodular density left upper lobe image 115 series 3. Mild emphysematous changes are seen in the lung. Other areas of ground-glass appearance right lung apex are seen with a pulmonary nodule posterior left apex measuring 4.3 mm image 59 series 3. 4.5 mm pulmonary nodule left upper lobe image 127 is demonstrated. Pulmonary nodule is seen in the left upper lobe base measuring 3.7 mm image 257 series 3.  Spiculated nodule is seen in the lateral left lower lobe measuring 18.6 mm diameter, previously measuring 28.6 mm diameter. Scar with nodularity is seen in the lateral lingular base image 272 series 3 measuring 8.2 mm diameter. Additional emphysematous changes are seen of the left lung base. Nodular densities in the right lung are obscured by the compressive atelectasis from the large right pleural effusion. Enlarged mediastinal lymph nodes are unchanged. No mediastinal or definitive hilar adenopathy seen and no axillary adenopathy demonstrated. The esophagus is unremarkable. Mild-to-moderate atherosclerotic disease of the thoracic aorta is seen. The ascending thoracic aorta measures 3.8 cm external AP diameter. The multiple low-attenuation lesions in the liver are less conspicuous on today's examination compared to the prior study. Primary hepatic parenchymal lesions or metastatic disease cannot be excluded. IMPRESSION: 1. Right-sided chest tube remains in place with a very large right pleural effusion resulting in significant compressive atelectasis the right lung with a small area of central aeration of the right upper lobe and shift of mediastinal structures to the left. No pneumothorax or pneumomediastinum. 2. Multiple pulmonary nodules in the left lung, some a which appears to be new compared to previous with a dominant nodule lateral left lower lobe smaller in size compared to previous. Round atelectasis may have this appearance. 3. The multiple pulmonary nodule seen in the right lung are obscured by the compressive atelectasis from the large right pleural effusion. 4. Fluid in the mediastinum may obscure enlarged lymph nodes with the enlarged mediastinal lymph nodes otherwise unchanged. 5. Multiple low-attenuation lesions in the liver are less conspicuous than the prior examination and may be due to a hepatic parenchymal lesions or metastatic disease. Right upper quadrant ultrasound in follow-up is  recommended. Dictation workstation:   [id number] This exam is available in DICOM format to non-affiliated healthcare facilities on a secure media free searchable basis with prior patient authorization.  The patient exposure is reported to a radiation dose index registry.  All CT examinations are performed with one or more of the following dose reduction techniques: Automated Exposure Control, Adjustment of mA and/or KV according to patient size, or use of iterative reconstruction techniques. Original Interpreting Physician:   FLAQUITA MASON MD Original Transcribed by/Date: MMODAL Sep 28 2023 10:45A Original Electronically Signed by/Date: FLAQUITA MASON MD Sep 28 2023 11:41A Addendum Interpreting Physician: Addendum Transcribed by/Date: NO ADDENDUM Addendum Electronically Signed by/Date:     CT angio chest w and wo IV contrast    Result Date: 9/5/2023  PROCEDURE:         ANGIO CHEST - WCT  3081 REASON FOR EXAM: Shortness of Breath RESULT: MRN: [id number] Patient Name: MOOSE OROZCO STUDY: ANGIO CHEST; 9/5/2023 9:17 pm INDICATION: Shortness of Breath COMPARISON: CT scan from July 2023 and plain radiographs from 09/05/2023. ACCESSION NUMBER(S): [id number] ORDERING CLINICIAN: JOSE ENRIQUE RODRÍGUEZ TECHNIQUE: Spiral axial images were obtained through the chest following bolus administration of 120 mL Omnipaque 350. Post processing coronal reconstruction images and 3-D coronal maximum intensity projection images were also performed. PATIENT RADIATION EXPOSURE DATA: CTDI (mGy): DLP (mGy/cm): FINDINGS: Findings of CT angiogram: Atherosclerotic calcifications are again present involving coronary arteries and the aorta. There is no aortic aneurysm or dissection. The pulmonary arteries are normal in course and caliber, without filling defects to suggest pulmonary embolism. Other findings of chest: There is right chest tube in position, similar to the radiograph from the same day. There is again a small, less than 5% pneumothorax in the right  lung base anteriorly. Subcutaneous emphysema is noted in the right flank, abdominal and chest wall and right side of the neck. There is thickening of the right major fissure, with several nodular densities in the right lower lung, the largest medially measuring 2.3 cm (axial image number 114 and in the right lower lobe more superiorly measuring 12 mm (axial image number 103. Pleural-based lesion is also seen in the as ago esophageal recess measuring 2.8 by 1.4 cm (axial image number 76. Emphysematous changes are noted in both lungs. There is interval development of pleural-based density in the left lower lobe laterally measuring 2 cm, most likely round atelectasis. Prominent mediastinal lymph nodes appear similar to the prior exam. Images from the upper abdomen are within normal limits. IMPRESSION: 1. No pulmonary embolism, aortic aneurysm or dissection. 2. Stable position of chest tube in the right lower chest, with less than 5% right basilar pneumothorax and persistent moderate subcutaneous emphysema. 3. Multiple nodular densities again seen in the right lung on a background of emphysema. Stable mildly prominent mediastinal lymph nodes. The findings are suspicious for lung malignancy; correlate clinically. 4. Interval development of nodular subpleural density in the left lower lung, most likely round atelectasis. Dictation workstation:   [id number] This exam is available in DICOM format to non-affiliated healthcare facilities on a secure media free searchable basis with prior patient authorization.  The patient exposure is reported to a radiation dose index registry.  All CT examinations are performed with one or more of the following dose reduction techniques: Automated Exposure Control, Adjustment of mA and/or KV according to patient size, or use of iterative reconstruction techniques. Original Interpreting Physician:   MIRI HANLEY M.D. Original Transcribed by/Date: DOODAL Sep  5 2023  7:56P Original  Electronically Signed by/Date: MIRI HANLEY M.D. Sep  5 2023 9:44P Addendum Interpreting Physician: Addendum Transcribed by/Date: NO ADDENDUM Addendum Electronically Signed by/Date:     XR chest 1 view    Result Date: 9/5/2023  PROCEDURE:         CHEST PORTABLE - WXR  0018 REASON FOR EXAM: Sepsis RESULT: MRN: [id number] Patient Name: MOOSE OROZCO STUDY: CHEST PORTABLE; 9/5/2023 8:49 pm INDICATION: Sepsis COMPARISON: From the same day at 1149 hours ACCESSION NUMBER(S): [id number] ORDERING CLINICIAN: JOSE ENRIQUE RODRÍGUEZ FINDINGS: The study is limited due to rotation. The cardiomediastinal silhouette is within normal limits for the technique. There is stable position of chest tube along the right lung base. Previously seen small right basilar pneumothorax is not visualized, possibly due to interval development of small right pleural effusion. Infiltrates/atelectatic changes are again seen in the right lower lung. Degenerative changes and scoliosis involves the spine.. IMPRESSION: Stable position right chest tube. Previously seen right small basilar pneumothorax is not visualized, probably obscured by developing right effusion. Persistent right basilar infiltrates/atelectatic changes. Dictation workstation:   [id number] Original Interpreting Physician:   MIRI HANLEY M.D. Original Transcribed by/Date: MMODAL Sep  5 2023  7:56P Original Electronically Signed by/Date: MIRI HANLEY M.D. Sep  5 2023 8:55P Addendum Interpreting Physician: Addendum Transcribed by/Date: NO ADDENDUM Addendum Electronically Signed by/Date:     XR chest 2 views    Result Date: 9/5/2023  PROCEDURE:         CHEST 2 VIEW - WXR  0020 REASON FOR EXAM: z98.890 OTHER SPECIFIED POSTPROCEDURAL STATES RESULT: MRN: [id number] Patient Name: MOOSE OROZCO STUDY: CHEST 2 VIEW; 9/5/2023 12:11 pm INDICATION: z98.890 OTHER SPECIFIED POSTPROCEDURAL STATES. COMPARISON: 30 August 2023. ACCESSION NUMBER(S): [id number] ORDERING CLINICIAN: PAUL DANIEL TECHNIQUE:  2 views of the chest were performed. FINDINGS: Hyperinflation of the left lung due to emphysematous changes seen. Subcutaneous emphysema persists right torso/hemithorax and less so left torso. Right-sided chest tube is in place. Smaller pneumothorax on the right is demonstrated with increased aeration of the right lung with the demarcation line no lung persists. The heart and mediastinal structures are within normal limits. Density right cardiac margin slightly differs in appearance due to the increased aeration of the right lung.  Small right pleural effusion persists. IMPRESSION: Right-sided chest tube in place. Small right pneumothorax seen with persistent infiltrate and density in the right lung base. Underlying emphysematous change. Dictation workstation:   [id number] Original Interpreting Physician:   FLAQUITA MASON MD Original Transcribed by/Date: MMMALENA Sep  5 2023 11:13A Original Electronically Signed by/Date: FLAQUITA MASON MD Sep  5 2023 12:15P Addendum Interpreting Physician: Addendum Transcribed by/Date: NO ADDENDUM Addendum Electronically Signed by/Date:       Current Outpatient Medications   Medication Instructions    acetaminophen (TYLENOL) 325 mg, Every 6 hours PRN    albuterol 90 mcg/actuation inhaler 2 puffs, Every 4 hours PRN    ALPRAZolam (XANAX) 0.5 mg, oral, 2 times daily PRN    bisacodyl (Dulcolax) 10 mg suppository     Breztri Aerosphere 160-9-4.8 mcg/actuation HFA aerosol inhaler 2 puffs, inhalation, 2 times daily    buPROPion XL (WELLBUTRIN XL) 300 mg, oral, Daily before breakfast    calcium carbonate-vitamin D3 (Calcium 600 with Vitamin D3) 600 mg-10 mcg (400 unit) chewable tablet oral    capsaicin (Zostrix) 0.025 % cream 1 Application, Topical, Every 8 hours    cyanocobalamin (Vitamin B-12) 500 mcg tablet 1 tablet, oral, Daily    diclofenac (Cataflam) 50 mg tablet          Assessment/Plan   Principal Problem:    Pneumothorax  Active Problems:    Primary adenocarcinoma of lung, right  (CMS/HCC)    Dyspnea    Cough with hemoptysis      Continue current medications.  Physical therapy and Occupational Therapy.  Control pain.  Patient will need a thoracentesis.  Consult cardiothoracic surgery and pulmonary.  DNR was discussed with the patient.  Patient does not understand [place] frustration.  Patient still wants to be full code.  Will discuss with family and patient again tomorrow.  For details see the orders.       Robb Mckeon MD

## 2023-10-02 NOTE — NURSING NOTE
Pt asleep. No s/s of pain/discomfort noted. No change noted from initial shift assessment. Call light in reach.

## 2023-10-02 NOTE — CONSULTS
Consults    Reason For Consult  Goals of care     History Of Present Illness  Chika Ford is a 78 y.o. female with past medical history of  is presenting with hospital 9/30/2023 with complaints of shortness of breath.  The patient has a history of non-small cell lung cancer with malignant pleural effusion.  Within the last few months, she had a Pleurx catheter placed for symptom control.  She had a recent hospitalization to [facility] 9/28/2023 with malfunctioning Pleurx catheter.  She was seen by cardiothoracic surgery at that time who apparently repaired the catheter and removed about 1500 mL.  She was discharged 1 day later.  Within 24 hours, she again became short of breath and was readmitted to the hospital.  Pertinent laboratory findings at the presentation showed basically normal serum chemistries.  White blood cell count was 8.2k, hemoglobin and hematocrit were 10.7 and 34.2 respectively.  Chest x-ray showed large right pneumothorax, decreased right pleural fluid, right hydropneumothorax.  She was evaluated by pulmonary today who felt that she has trapped lung and they are recommending comfort care at this time.  Today, the patient is relatively comfortable.  She is less short of breath when she is lying more flat on the left side.  She denies complaints of pain at present.  She did say that she was having some anxiety last night and was treated with a Xanax which helped.  She stated that she did have a conversation with Dr. Ruelas today and she realizes that her shortness of breath is not really curable.  Normally follows with Dr. Olmos at the [facility].  She stated that she received 1 treatment with immunotherapy and was trying to get herself into a functional stay to receive further immunotherapy.  Unfortunately she missed the treatment scheduled this week because of the hospitalization.  She has complaints of persistent weight loss in general over past 6 months.  She also states she  has no appetite and has issues with taste of certain foods.            Personal/Social History  She had a history of tobacco use for many years.  She denies use of alcohol.  She lives at home alone with her dog.  She has two adult children who provide support.  She is in the process of establishing a hpoa, living will and dpoa with her .          Past Medical History  Osteoarthritis  NSCLC  Malignant Pleural Effusion  Anxiety  Trapped lung syndrome    Surgical History  Other procedures:  Lung biopsy  Placement of pleurx cath     Family History  Not pertinent to presenting problem.     Allergies  Penicillins    Review of Systems  14 point review of systems conducted. Please see hpi and pmsh for pertinent positives.    Physical Exam    General: Chronically ill, frail-appearing female lying in bed, no acute distress, appears older than stated age.  Head/ears/nose/throat:  Normocephalic, atraumatic, perrl, sclarae anicteric, oral mm moist, no lesions noted.   Neck: Neck is supple, no palpable adenopathy appreciated.  No JVD or carotid bruits noted.  Respiratory: breath sounds primarily clear, quite diminished on right.  Respirations even and nonlabored at present.  Wearing oxygen per nasal cannula.  Right Pleurx catheter intact, capped, site dressed.  Cardiovascular: Regular at present, unable to appreciate murmurs or rub at this time.   No peripheral edema noted.    Gastrointestinal: Abdomen is soft, flat, no guarding or grimace with light palpation.  Bowel sounds are present.  Extremities: No clubbing or cyanosis noted.  Hands and feet warm to touch.  Musculoskeletal:  No injury or deformity noted, moves all extremities on command.  Equal hand grasp bilaterally.  Neurologic: Awake alert and oriented to person place and time.  Follows all commands.  No focal neurologic deficit appreciated at this time.  Skin: Anterior skin primarily clean dry and intact.   Some areas of bruising on the bilateral upper  "extremities.  Psychiatric: Appropriate mood and affect, cooperative with exam.    Last Recorded Vitals  Blood pressure 129/65, pulse 92, temperature 36.8 °C (98.2 °F), temperature source Oral, resp. rate 18, height 1.651 m (5' 5\"), weight 50.8 kg (112 lb), SpO2 99 %.    Relevant Results  Results for orders placed or performed during the hospital encounter of 09/30/23 (from the past 24 hour(s))   CBC and Auto Differential   Result Value Ref Range    WBC 6.8 4.4 - 11.3 x10*3/uL    nRBC 0.0 0.0 - 0.0 /100 WBCs    RBC 4.94 4.00 - 5.20 x10*6/uL    Hemoglobin 10.2 (L) 12.0 - 16.0 g/dL    Hematocrit 31.8 (L) 36.0 - 46.0 %    MCV 64 (L) 80 - 100 fL    MCH 20.6 (L) 26.0 - 34.0 pg    MCHC 32.1 32.0 - 36.0 g/dL    RDW 17.4 (H) 11.5 - 14.5 %    Platelets 373 150 - 450 x10*3/uL    MPV 11.3 7.5 - 11.5 fL    Neutrophils % 65.7 40.0 - 80.0 %    Immature Granulocytes %, Automated 0.3 0.0 - 0.9 %    Lymphocytes % 19.5 13.0 - 44.0 %    Monocytes % 10.2 2.0 - 10.0 %    Eosinophils % 3.4 0.0 - 6.0 %    Basophils % 0.9 0.0 - 2.0 %    Neutrophils Absolute 4.49 1.60 - 5.50 x10*3/uL    Immature Granulocytes Absolute, Automated 0.02 0.00 - 0.50 x10*3/uL    Lymphocytes Absolute 1.33 0.80 - 3.00 x10*3/uL    Monocytes Absolute 0.70 0.05 - 0.80 x10*3/uL    Eosinophils Absolute 0.23 0.00 - 0.40 x10*3/uL    Basophils Absolute 0.06 0.00 - 0.10 x10*3/uL   Basic Metabolic Panel   Result Value Ref Range    Glucose 81 65 - 99 mg/dL    Sodium 143 133 - 145 mmol/L    Potassium 3.8 3.4 - 5.1 mmol/L    Chloride 106 97 - 107 mmol/L    Bicarbonate 25 24 - 31 mmol/L    Urea Nitrogen 14 8 - 25 mg/dL    Creatinine 0.80 0.40 - 1.60 mg/dL    eGFR 75 >60 mL/min/1.73m*2    Calcium 9.8 8.5 - 10.4 mg/dL    Anion Gap 12 <=19 mmol/L         TECHNIQUE:  PA and LAT views of the chest were obtained.      FINDINGS:  Again seen is a large right-sided pneumothorax which is not  significantly changed. Right PleurX catheter in stable position in  the right lung base. Slight " "interval decrease in degree of right  pleural fluid however there is moderate right pleural fluid  remaining. The component of pneumothorax is not significantly  changed. Associated atelectatic changes of the right lung, unchanged.  The left hemithorax remains clear. No left pleural effusion. The  cardiomediastinal silhouette is unremarkable.      The osseous structures are intact.       Impression:     Slight decreased right pleural fluid, however, the right  hydropneumothorax is otherwise not significantly changed.     Scheduled meds:  ascorbic acid, 500 mg, oral, Daily  calcium carbonate-vitamin D3, 1 tablet, oral, TID with meals  capsaicin, 1 Application, Topical, q8h  cyanocobalamin, 500 mcg, oral, Daily  [START ON 10/3/2023] ferrous sulfate, 65 mg of iron, oral, Daily with breakfast  tiotropium, 2 puff, inhalation, Daily   And  fluticasone furoate-vilanteroL, 1 puff, inhalation, Daily  folic acid, 1 mg, oral, Daily  heparin (porcine), 5,000 Units, subcutaneous, q8h  psyllium, 1 packet, oral, Daily     PRN meds:   Acetaminophen  Albuterol  Present  Bisacodyl  Diclofenac  Oxygen      Assessment/Plan   NSCLC: treated with initial dose of immunotherapy, but not in functional shape to continue treatment so far.  Patient with malignant pleural effusion, treated intermittently with thoracentesis.  Med onc is apparently offering more immunotherpay.     Dyspnea: sob is intermittent and worse when she is upright.  This can cause her to get anxious.  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  She indicated that oxygen helps with comfort.  Also offered symptom control with  morphine oral solution 3mg sl q4 prn sob.  Patient will try and see if this helps.    Asthenia:  likely multifactorial with sob, poor appetite, cancer.  Encourage good symptom control and protein supplements/ regular diet.     Anxiety: \"antsy\" last night, just could not get comfortable.  No raing " thoughts.  Tried xanax which helped.  Continue for now as well.     Palliative care; Goals of care:  Patient seen and examined, data reviewed.  The patient is a capable decision maker at this time.  She states her daughter will be her formal healthcare power of .  This documentation is with her .  She has a son as well.    Long discussion conducted with the patient and her daughter regarding her current clinical state including review of lab results, radiology results and current plan of care.  Reviewed the patient's medical comorbidities.  We reviewed the patient's relative cancer history as well.  Till recently, the patient was attempting to achieve cancer treatment with immunotherapy.  Unfortunately she has been unable to continue with treatment secondary to intermittent episodes of shortness of breath.  We discussed that it is Dr. Ruelas's opinion that the patient has trapped lung.  This is not a curable condition and will cause her to have significant shortness of breath for the balance of her life.  In general, the patient lives at home alone.  She has a dog.  She states that she is very fatigued and weak and but is able to handle her activities of daily living.  She has significant support from her son and daughter.  Original goal was to have her acute issues treated so that she may return home at her previous level of function and continue some sort of treatment for her cancer.  She did state that she is tired of coming back and forth to the hospital because of her shortness of breath.  We talked about attempting to manage her symptoms a little more aggressively.  In general, the patient stated that if she is going to continue to have this level of shortness of breath, she would consider transitioning to a comfort/best supportive care model.  We discussed how hospice could benefit her in this regard.  The patient and the daughter agree that controlling her symptoms is definitely a  priority.  They are interested in referral to hospice [facility] for an informational meeting at this time.    Code status was discussed in detail with the patient.  Pros and cons of CPR were reviewed.  At this time, the patient is requesting that we change her code status to [id number].    The patient is on disease modifying mild care with aggressive medical treatment and symptom control at present.  She is considering whether or not a transition to comfort medical care would be her best option.  She is interested in having an informational meeting with [facility].  This was ordered and referral was called to the [facility] who will schedule a meeting with the patient and her daughter.  Will update the chart I find out about the meeting time.  CODE STATUS formally changed in the electronic medical record orders as requested.  All of the patient and her daughter's questions were answered at this time.     Above discussed with primary team, AIDN Dickerson.  Discussed with patient's RN.  Thank you much for the consultation.  We will follow with you.    I spent 110 minutes in the professional and overall care of this patient.  35 minutes spent in advanced care planning.       LISSA Joseph-CNP

## 2023-10-02 NOTE — NURSING NOTE
"Pt medicated with xanax for anxiety \"I feel fidgety\"  Pt reports she has [facility] set up to drain her pluerex tomorrow am and has called and let home health nurse know she is here. Asking if we will drain in the am   Pt made aware we will when the doctor orders to be done    "

## 2023-10-02 NOTE — CARE PLAN
The patient's goals for the shift include breath better    The clinical goals for the shift include breath better    Over the shift, the patient did not make progress toward the following goals. Barriers to progression include . Recommendations to address these barriers include .

## 2023-10-02 NOTE — PROGRESS NOTES
Spiritual Care Visit    Clinical Encounter Type  Visited With: Patient  Routine Visit: Introduction  Continue Visiting: Yes    Sikhism Encounters  Sikhism Needs: Prayer    Values/Beliefs  Spiritual Requests During Hospitalization: Anointed and received Communion today    Sacramental Encounters  Communion: Patient wants communion  Sacrament of Sick-Anointing: Anointed     Ranjit Sibley

## 2023-10-02 NOTE — NURSING NOTE
"Pt states \"I forgot to ask the doctor how my chest xray was\"  Reiterated that the xray shows increased cancer in lungs  \"that is right that's what he said\"  "

## 2023-10-02 NOTE — CARE PLAN
Coached pt on purse lip breathing and to refrain from exerting herself with help during this visit.

## 2023-10-02 NOTE — PROGRESS NOTES
Chika Ford is a 78 y.o. female on day 0 of admission presenting with Pneumothorax.    Subjective   Patient seen and examined.  Resting in bed in no acute distress.  Awake alert oriented x 3.  Shortness of breath improved compared to yesterday.  + Cough dark red sputum.  No chest pain.  No other complaints.  Discussed living will, durable health care power of , and code states.  She states she is awaiting to sign the documentation, daughter is to have durable healthcare power of .  Discussed code status.  She is a full code, she would like to discuss with her daughter.  Spoke with nursing, no issues    Objective     Physical Exam  Constitutional:       Comments: Frail, chronically ill appearing   HENT:      Head: Normocephalic and atraumatic.      Right Ear: Tympanic membrane normal.      Left Ear: Tympanic membrane normal.      Nose: Nose normal.      Mouth/Throat:      Mouth: Mucous membranes are moist.      Pharynx: Oropharynx is clear.   Eyes:      Extraocular Movements: Extraocular movements intact.      Conjunctiva/sclera: Conjunctivae normal.      Pupils: Pupils are equal, round, and reactive to light.   Cardiovascular:      Rate and Rhythm: Normal rate and regular rhythm.      Pulses: Normal pulses.      Heart sounds: Normal heart sounds.   Pulmonary:      Effort: Pulmonary effort is normal.      Breath sounds: Normal breath sounds.      Comments: Lung sounds diminished throughout  2 liters nasal cannula, no home oxygen  Trace dark red sputum noted   Right pleurx drain in place dressing clean dry intact  Abdominal:      General: Bowel sounds are normal.      Palpations: Abdomen is soft.   Genitourinary:     Comments: Rectal examination deferred  Musculoskeletal:         General: Normal range of motion.      Cervical back: Normal range of motion and neck supple.   Skin:     General: Skin is warm and dry.      Capillary Refill: Capillary refill takes less than 2 seconds.      Comments: Right  "pleurx drain with dressing clean dry intact   Neurological:      General: No focal deficit present.      Mental Status: She is alert and oriented to person, place, and time.   Psychiatric:         Mood and Affect: Mood normal.         Behavior: Behavior normal.         Thought Content: Thought content normal.         Judgment: Judgment normal.       Last Recorded Vitals  Blood pressure 127/53, pulse 74, temperature 36.6 °C (97.9 °F), temperature source Oral, resp. rate 19, height 1.651 m (5' 5\"), weight 50.8 kg (112 lb), SpO2 100 %.  Intake/Output last 3 Shifts:  I/O last 3 completed shifts:  In: - (0 mL/kg)   Out: 600 (11.8 mL/kg) [Urine:450 (0.2 mL/kg/hr); Drains:150]  Weight: 50.8 kg     Relevant Results    Scheduled medications  ascorbic acid, 500 mg, oral, Daily  calcium carbonate-vitamin D3, 1 tablet, oral, TID with meals  capsaicin, 1 Application, Topical, q8h  cyanocobalamin, 500 mcg, oral, Daily  [START ON 10/3/2023] ferrous sulfate, 65 mg of iron, oral, Daily with breakfast  tiotropium, 2 puff, inhalation, Daily   And  fluticasone furoate-vilanteroL, 1 puff, inhalation, Daily  folic acid, 1 mg, oral, Daily  heparin (porcine), 5,000 Units, subcutaneous, q8h  psyllium, 1 packet, oral, Daily      Continuous medications     PRN medications  PRN medications: acetaminophen, albuterol, ALPRAZolam, bisacodyl, diclofenac, oxygen    Results for orders placed or performed during the hospital encounter of 09/30/23 (from the past 24 hour(s))   CBC and Auto Differential   Result Value Ref Range    WBC 6.8 4.4 - 11.3 x10*3/uL    nRBC 0.0 0.0 - 0.0 /100 WBCs    RBC 4.94 4.00 - 5.20 x10*6/uL    Hemoglobin 10.2 (L) 12.0 - 16.0 g/dL    Hematocrit 31.8 (L) 36.0 - 46.0 %    MCV 64 (L) 80 - 100 fL    MCH 20.6 (L) 26.0 - 34.0 pg    MCHC 32.1 32.0 - 36.0 g/dL    RDW 17.4 (H) 11.5 - 14.5 %    Platelets 373 150 - 450 x10*3/uL    MPV 11.3 7.5 - 11.5 fL    Neutrophils % 65.7 40.0 - 80.0 %    Immature Granulocytes %, Automated 0.3 0.0 " - 0.9 %    Lymphocytes % 19.5 13.0 - 44.0 %    Monocytes % 10.2 2.0 - 10.0 %    Eosinophils % 3.4 0.0 - 6.0 %    Basophils % 0.9 0.0 - 2.0 %    Neutrophils Absolute 4.49 1.60 - 5.50 x10*3/uL    Immature Granulocytes Absolute, Automated 0.02 0.00 - 0.50 x10*3/uL    Lymphocytes Absolute 1.33 0.80 - 3.00 x10*3/uL    Monocytes Absolute 0.70 0.05 - 0.80 x10*3/uL    Eosinophils Absolute 0.23 0.00 - 0.40 x10*3/uL    Basophils Absolute 0.06 0.00 - 0.10 x10*3/uL   Basic Metabolic Panel   Result Value Ref Range    Glucose 81 65 - 99 mg/dL    Sodium 143 133 - 145 mmol/L    Potassium 3.8 3.4 - 5.1 mmol/L    Chloride 106 97 - 107 mmol/L    Bicarbonate 25 24 - 31 mmol/L    Urea Nitrogen 14 8 - 25 mg/dL    Creatinine 0.80 0.40 - 1.60 mg/dL    eGFR 75 >60 mL/min/1.73m*2    Calcium 9.8 8.5 - 10.4 mg/dL    Anion Gap 12 <=19 mmol/L     XR chest 2 views    Result Date: 10/1/2023  Interpreted By:  Henry Andre, STUDY: XR CHEST 2 VIEWS; 10/1/2023 7:51 am   INDICATION: Signs/Symptoms:SOB, hydropnuemo s/p draining   COMPARISON: 09/30/2023   ACCESSION NUMBER(S): [id number]   ORDERING CLINICIAN: DESTIN GORDON   TECHNIQUE: PA and LAT views of the chest were obtained.   FINDINGS: Again seen is a large right-sided pneumothorax which is not significantly changed. Right PleurX catheter in stable position in the right lung base. Slight interval decrease in degree of right pleural fluid however there is moderate right pleural fluid remaining. The component of pneumothorax is not significantly changed. Associated atelectatic changes of the right lung, unchanged. The left hemithorax remains clear. No left pleural effusion. The cardiomediastinal silhouette is unremarkable.   The osseous structures are intact.       Slight decreased right pleural fluid, however, the right hydropneumothorax is otherwise not significantly changed.     Signed by: Henry Andre 10/1/2023 8:08 AM Dictation workstation:   [id number]    XR chest 2  views    Result Date: 9/30/2023  Interpreted By:  Henry Andre, STUDY: XR CHEST 2 VIEWS; 9/30/2023 8:45 pm   INDICATION: Signs/Symptoms:sob   COMPARISON: 09/29/2023   ACCESSION NUMBER(S): [id number]   ORDERING CLINICIAN: MIKEL ORTIZ   TECHNIQUE: PA and LAT views of the chest were obtained.   FINDINGS: Again seen is a large right hydropneumothorax. There is a PleurX catheter seen within the right hemithorax. The degree of moderate effusion has not changed however there is interval worsening of large right pneumothorax. There is no significant change in degree of pleural effusion or pneumothorax. The left lung remains clear.   The cardiomediastinal silhouette is within normal limits.       Large right hydropneumothorax. The degree of pneumothorax has slightly worsened since the prior study.     Signed by: Henry Andre 9/30/2023 9:07 PM Dictation workstation:   [id number]    Assessment/Plan   Principal Problem:    Pneumothorax  Active Problems:    Dyspnea  Cough with Hemoptysis  Adenocarcinoma right lung  Normocytic anemia  Iron deficiency anemia    ASSESSMENT:  Right hydropneumothorax  Malignant pleural effusion  Dyspnea  Hemoptysis  Non small cell lung carcinoma  Normocytic anemia  Iron deficiency anemia    PLAN:  Status post right pleur-x drainage 500 ml pleural fluid in ED --- Repeat chest x-ray shows slightly decreased right pleural fluid, right hydropneumothorax unchanged.  Symptoms improved.  Dyspnea improved.  + Hemoptysis.  Respiratory status, pulse oximetry stable 100% on 2 liters nasal cannula (no home oxygen).  Consult Pulmonology, Cardiothoracic surgery.  Oxygen 2 liters nasal cannula.  Titrate oxygen as saturations allow.  Use incentive spirometer 10x/hour while awake.  Sputum.  Hold Heparin for hemoptysis.  Continue home p.o ferrous sulfate, folic acid.  Monitor H&H.  Review and update home medications.  Encourage p.o intake.  Increase activity as tolerated.  PT/OT.  Fall precautions.  Up  with assistance.  Deep vein thrombosis prophylaxis Heparin subcutaneous - hold for hemoptysis.  Supportive care.  Patient reassured.  Case management consultation for discharge planning - presents from home with home health care.  Outpatient follow up with Hematology/Oncology.  Discussed code status.  She is a FULL CODE.  She would like to discuss with her daughter.    Discussed with Dr. Mckeon    ADDENDUM:   Discussed with RICHARD, Berkley Santamaria.  Input appreciated  Discussed with Pulmonology, Dr. Ruelas, recommend hospice and comfort care   Discussed with Palliative medicine Tori GUIDRY CNP, consultation placed    Per Palliative medicine, Hospice [facility] referral placed for informational meeting  DNR CCA DNI    LISSA Jeronimo-ADIN

## 2023-10-03 NOTE — NURSING NOTE
Patient alert and oriented x 3. Patient is in bed resting. Patient assessed at this time. Patient has diminished lung sounds upon auscultation. Patient has dyspnea on exertion. Patient is on 2 liters nasal cannula. Patients abdomen is soft, non distended, non tender with active bowel sounds. Patients skin is pale, loose, thin, dry and fragile. Patient has weakness to bilateral lower extremities. Patient denies any pain at this time. Patient does not appear to be in any distress. Fall precautions reviewed and implemented. Bed brakes locked, bed in lowest position, call light within reach, bed alarm activated. Will continue to monitor patient to ensure patient safety.

## 2023-10-03 NOTE — CARE PLAN
"  Problem: Nutrition  Goal: Oral intake greater than 50%  Outcome: Progressing  Goal: Oral intake greater 75%  Outcome: Progressing  Goal: Consume prescribed supplement  Outcome: Progressing  Goal: Adequate PO fluid intake  Outcome: Progressing  Goal: Nutrition support goals are met within 48 hrs  Outcome: Progressing  Goal: BG  mg/dL  Outcome: Progressing  Goal: Lab values WNL  Outcome: Progressing  Goal: Electrolytes WNL  Outcome: Progressing  Goal: Promote healing  Outcome: Progressing  Goal: Maintain stable weight  Outcome: Progressing     Problem: Pain - Adult  Goal: Verbalizes/displays adequate comfort level or baseline comfort level  Outcome: Progressing     Problem: Safety - Adult  Goal: Free from fall injury  Outcome: Progressing     Problem: Discharge Planning  Goal: Discharge to home or other facility with appropriate resources  Outcome: Progressing     Problem: Chronic Conditions and Co-morbidities  Goal: Patient's chronic conditions and co-morbidity symptoms are monitored and maintained or improved  Outcome: Progressing     Problem: Fall/Injury  Goal: Not fall by end of shift  Outcome: Progressing  Goal: Be free from injury by end of the shift  Outcome: Progressing  Goal: Verbalize understanding of personal risk factors for fall in the hospital  Outcome: Progressing  Goal: Verbalize understanding of risk factor reduction measures to prevent injury from fall in the home  Outcome: Progressing  Goal: Use assistive devices by end of the shift  Outcome: Progressing  Goal: Pace activities to prevent fatigue by end of the shift  Outcome: Progressing     Problem: Respiratory  Goal: Minimize anxiety/maximize coping throughout shift  Outcome: Progressing   The patient's goals for the shift include breath better    The clinical goals for the shift include breath better    Over the shift, the patient did not make progress toward the following goals. Barriers to progression include \"patient appears to have a " "flat, withdrawn affect\". Recommendations to address these barriers include reduce anxiety and stimuli.    "

## 2023-10-03 NOTE — NURSING NOTE
"This nurse called pharmacy to verify orders for morphine 10mg /0.5ml concentrated oral solution to give sublingual, 5 MG was not scanning. Pharmacy stated, \"previous nurse had to over ride medication due to pharmacy prefilled morphine barcode not scanning.\" Pharmacy verified to give half of the .5 in the syringe and to have second RN verify medication. This nurse and second RN, Sabine Esteban RN, BSN verified patient, order, route, medication and wasted .25 ml. Will continue to monitor patient.  "

## 2023-10-03 NOTE — PROGRESS NOTES
Nutrition Follow-up Note  Assessment    Patient w/Hx of non-small cell lung cancer. Pulmonology notes no further options from a respiratory standpoint. Family meeting with Hospice today. Patient with poor po intake, 20# weight loss since June. Patient declined all nutritional supplements 9/29.     Reason for Hospital Admission:  Chika Ford is a 78 y.o. female who presents for Shortness of Breath (Pt presents to shortness of breathing since this morning, pt states that this has been happening for months, gradual onset, pmh lung cancer states she is being treated for fluid on the lungs, no chest pain, no O2 use at home, O2 levels okay at home per daughter ).    Patient Active Problem List   Diagnosis    Primary adenocarcinoma of lung, right (CMS/HCC)    Pneumothorax    Dyspnea    Cough with hemoptysis       has no past medical history on file.    has a past surgical history that includes CT guided thoracentesis (7/30/2023).       Allergies   Allergen Reactions    Penicillins Hives        Current Facility-Administered Medications:     acetaminophen (Tylenol) tablet 325 mg, 325 mg, oral, q6h PRN, Robb Mckeon MD, 325 mg at 10/03/23 0901    albuterol 90 mcg/actuation inhaler 2 puff, 2 puff, inhalation, q4h PRN, Rbob Mckeon MD    ALPRAZolam (Xanax) tablet 0.5 mg, 0.5 mg, oral, BID PRN, Robb Mckeon MD, 0.5 mg at 10/03/23 0003    ascorbic acid (Vitamin C) tablet 500 mg, 500 mg, oral, Daily, LISSA Jeronimo-CNP, 500 mg at 10/03/23 0902    bisacodyl (Dulcolax) suppository 10 mg, 10 mg, rectal, Once PRN, Robb Mckeon MD    calcium carbonate-vitamin D3 500 mg-5 mcg (200 unit) per tablet 1 tablet, 1 tablet, oral, TID with meals, Robb Mckeon MD, 1 tablet at 10/03/23 1251    capsaicin (Zostrix) 0.025 % cream 1 Application, 1 Application, Topical, q8h, Robb Mckeon MD, 1 Application at 10/03/23 1251    cyanocobalamin (Vitamin B-12) tablet 500 mcg, 500 mcg, oral, Daily,  "Robb Mckeon MD, 500 mcg at 10/03/23 0902    diclofenac (Voltaren) EC tablet 50 mg, 50 mg, oral, BID PRN, Robb Mckeon MD    ferrous sulfate 325 (65 Fe) MG tablet 65 mg of iron, 65 mg of iron, oral, Daily with breakfast, LIZBET Jeronimo, 65 mg of iron at 10/03/23 0903    tiotropium (Spiriva Respimat) 2.5 mcg/actuation inhaler 2 puff, 2 puff, inhalation, Daily, 2 puff at 10/03/23 0837 **AND** fluticasone furoate-vilanteroL (Breo Ellipta) 200-25 mcg/dose inhaler 1 puff, 1 puff, inhalation, Daily, Robb Mckeon MD, 1 puff at 10/03/23 0840    folic acid (Folvite) tablet 1 mg, 1 mg, oral, Daily, LIZBET Jeronimo, 1 mg at 10/03/23 0902    heparin (porcine) injection 5,000 Units, 5,000 Units, subcutaneous, q8h, LIZBET Jeronimo, 5,000 Units at 10/02/23 2059    melatonin tablet 3 mg, 3 mg, oral, Nightly PRN, LIZBET Joseph    morphine 10 mg/0.5 mL concentrated oral solution 5 mg, 5 mg, oral, q4h PRN, LIZBET Joseph    oxygen (O2) therapy, , inhalation, q8h, Robb Mckeon MD    psyllium (Metamucil) 3.4 gram packet 1 packet, 1 packet, oral, Daily, Robb Mckeon MD, 1 packet at 10/02/23 0852  Nutrition Pertinent meds: Vit B, Vit C     Lab Results   Component Value Date    WBC 6.0 10/03/2023    HGB 10.1 (L) 10/03/2023    HCT 31.4 (L) 10/03/2023     10/03/2023    ALT 11 09/29/2023    AST 13 09/29/2023     10/02/2023    K 3.8 10/02/2023     10/02/2023    CREATININE 0.80 10/02/2023    BUN 14 10/02/2023    CO2 25 10/02/2023    TSH 2.08 09/27/2023    INR 1.1 09/05/2023       Dietary Orders (From admission, onward)       Start     Ordered    10/01/23 1303  Adult diet Regular  Diet effective now        Question:  Diet type  Answer:  Regular    10/01/23 1309                   History:  Food and Nutrient History  Energy Intake: Poor < 50 %     Anthropometrics:  Height: 165.1 cm (5' 5\")  Weight: 50.8 kg (111 lb 15.9 oz)  BMI " "(Calculated): 18.64    Weight Change: -0.01    Weight Change  Weight History / % Weight Change: Pt w/20 lb weight loss since June  Significant Weight Loss: Yes  Interpretation of Weight Loss: >10% in 6 months     IBW/kg (Dietitian Calculated): 56.82 kg     Energy Needs:  Calculated Energy Needs Using Equations  Height: 165.1 cm (5' 5\")    Estimated Energy Needs  Total Energy Estimated Needs (kCal):  (2009-2087)  Method for Estimating Needs: 30-35 kcal/kg    Estimated Protein Needs  Total Protein Estimated Needs (g): 61 g  Method for Estimating Needs: 1.2 gm/kg    Estimated Fluid Needs  Total Fluid Estimated Needs (mL):  (8944-5416)  Method for Estimating Needs: 1 mL/kcal       utrition Focused Physical Findings:  Subcutaneous Fat Loss  Orbital Fat Pads: Mild-Moderate (slight dark circles and slight hollowing)  Buccal Fat Pads: Mild-Moderate (flat cheeks, minimal bounce)  Triceps: Mild-moderate (less than ample fat tissue)  Ribs: Defer    Muscle Wasting  Temporalis: Mild-Moderate (slight depression)  Pectoralis (Clavicular Region): Mild-Moderate (some protrusion of clavicle)  Deltoid/Trapezius: Mild-Moderate (slight protrusion of acromion process)  Interosseous: Mild-Moderate (slightly depressed area between thumb and forefinger)  Trapezius/Infraspinatus/Supraspinatus (Scapular Region): Mild-Moderate (slight protrusion of scapula)  Quadriceps: Defer  Gastrocnemius: Defer       Diagnosis   Diagnosis:  Malnutrition Diagnosis  Patient has Malnutrition Diagnosis: Yes  Diagnosis Status: New  Malnutrition Diagnosis: Severe malnutrition related to disease or condition  As Evidenced by: po intake <75% > 1 month, 15.3% weight loss in < 6 months, moderate subcutaneous fat and muscle deficits    Patient has Nutrition Diagnosis: Yes  Nutrition Diagnosis 1: Inadequate energy intake  Diagnosis Status (1): Ongoing  Related to (1): drecreased ablility to consume sufficient energy  As Evidenced by (1): poor po intake, weight loss   "   Interventions/Recommendations   Interventions/Recommendations:   Patient declines nutritional supplements    Food and/or Nutrient Delivery Interventions  Interventions: Meals and snacks    Goal: Provide as tolerated      Education Documentation  No documentation found.    Monitoring and Evaluation   Monitoring/Evaluation:  Food and Nutrient Related History  Criteria: Patient will consume >50% of all meals and snacks     Anthropometrics: Body Composition/Growth/Weight History  Criteria: Patient will gain/maintain weight      Follow Up  Time Spent (min): 50 minutes  Last Date of Nutrition Visit: 10/03/23  Nutrition Follow-Up Needed?: 3-8 days  Follow up Comment: 10/9/23

## 2023-10-03 NOTE — PROGRESS NOTES
"Chika Ford is a 78 y.o. female on day 0 of admission presenting with Pneumothorax.    Subjective   Didn't sleep well last night.  Took a xanax for nerves, but didn't help with sleep.  Also tried morphine dose and got only minor relief with sob last kai.        Objective     Physical Exam  General: Chronically ill, frail-appearing female lying in bed, no acute distress, appears older than stated age.  Head/ears/nose/throat:  Normocephalic, atraumatic, sclarae anicteric, oral mm moist, no lesions noted.   Neck: No JVD noted.  Respiratory: breath sounds primarily clear, quite diminished on right.  Respirations even and nonlabored at present.  Wearing oxygen per nasal cannula.  Right Pleurx catheter intact, capped, site dressed.  Cardiovascular: Regular at present, unable to appreciate murmurs or rub at this time.   No peripheral edema noted.    Gastrointestinal: Abdomen is soft, flat, no guarding or grimace with light palpation.  Bowel sounds are present.  Extremities: No clubbing or cyanosis noted.  Hands and feet warm to touch.  Musculoskeletal:  No injury or deformity noted, moves all extremities on command.  Equal hand grasp bilaterally.  Neurologic: Awake alert and oriented to person place and time.  Follows all commands.  No focal neurologic deficit appreciated at this time.  Skin: Anterior skin primarily clean dry and intact.   Some areas of bruising on the bilateral upper extremities.  Psychiatric: Appropriate mood and affect, cooperative with exam.    Last Recorded Vitals  Blood pressure 116/55, pulse 70, temperature 36.4 °C (97.5 °F), temperature source Oral, resp. rate 16, height 1.651 m (5' 5\"), weight 50.8 kg (112 lb), SpO2 100 %.  Intake/Output last 3 Shifts:  I/O last 3 completed shifts:  In: 350 (6.9 mL/kg) [P.O.:350]  Out: 750 (14.8 mL/kg) [Urine:750 (0.4 mL/kg/hr)]  Weight: 50.8 kg     Relevant Results  Results for orders placed or performed during the hospital encounter of 09/30/23 (from the past " "24 hour(s))   CBC   Result Value Ref Range    WBC 6.0 4.4 - 11.3 x10*3/uL    nRBC 0.0 0.0 - 0.0 /100 WBCs    RBC 4.88 4.00 - 5.20 x10*6/uL    Hemoglobin 10.1 (L) 12.0 - 16.0 g/dL    Hematocrit 31.4 (L) 36.0 - 46.0 %    MCV 64 (L) 80 - 100 fL    MCH 20.7 (L) 26.0 - 34.0 pg    MCHC 32.2 32.0 - 36.0 g/dL    RDW 17.4 (H) 11.5 - 14.5 %    Platelets 337 150 - 450 x10*3/uL    MPV 10.9 7.5 - 11.5 fL      Current meds:  ascorbic acid, 500 mg, oral, Daily  calcium carbonate-vitamin D3, 1 tablet, oral, TID with meals  capsaicin, 1 Application, Topical, q8h  cyanocobalamin, 500 mcg, oral, Daily  ferrous sulfate, 65 mg of iron, oral, Daily with breakfast  tiotropium, 2 puff, inhalation, Daily   And  fluticasone furoate-vilanteroL, 1 puff, inhalation, Daily  folic acid, 1 mg, oral, Daily  heparin (porcine), 5,000 Units, subcutaneous, q8h  oxygen, , inhalation, q8h  psyllium, 1 packet, oral, Daily              Assessment/Plan   NSCLC: treated with initial dose of immunotherapy, but not in functional shape to continue treatment so far.  Patient with malignant pleural effusion, treated intermittently with thoracentesis.  D/w , no further treatment options per his discussion with Dr. Olmos.     Dyspnea: sob is intermittent and worse when she is upright.  This can cause her to get anxious.  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  She indicated that oxygen helps with comfort.  Tried dose of morphine SL last kai without much help.  Worried about being drowsy from morphine.  Agrees to increasing dose to 5mg to see if this helps.      Asthenia:  likely multifactorial with sob, poor appetite, cancer.  Encourage good symptom control and protein supplements/ regular diet.      Anxiety: \"antsy\" last night, just could not get comfortable.  No racing thoughts.  Tried xanax which helped previously, but not last night.  Does not want to try any meds she would have to take regularly " (buspar)     Palliative care; Goals of care:  Code Status:  DNR-CCA-DNI  The patient is a capable decision maker at this time.  She states her daughter will be her formal healthcare power of .  This documentation is with her .  She has a son as well.    Goals of care discussion conducted with the patient and her daughter yesterday.  Please see the detailed note.  Patient and her daughter are interested in informational meeting with hospice.  Referral placed yesterday and I have not been informed of the planned waiting time.  We will call them this morning for an update.  Dr. Ruelas plans to speak with the patient today regarding his conversation with Dr. Olmos.  Dr. Olmos agrees with referral for hospice at this time.      The patient is in disease modifying model of care at present with a possible plan to transition to comfort only model care.  Awaiting details regarding hospice informational meeting.  We will continue to follow and assist with symptom control.    Addendum:  hospice informational meeting set for today 1:30-2:00pm at the bedside.       Tori Zimmerman, APRN-CNP

## 2023-10-03 NOTE — NURSING NOTE
Patient alert and oriented x 2. Patient resting in bed. Bed side shift report received. Vital signs, labs, orders, plan of care reviewed. Fall precautions reviewed and implemented. Bed brakes locked, bed in lowest position, call light within reach, bed alarm activated. Will continue to monitor patient to ensure patient safety.

## 2023-10-03 NOTE — NURSING NOTE
Report received. Assumed care of patient. Patient resting in bed.   2045 patient awake. Provided pain medication per order.   0622 patient resting in bed. Morning medication given. Patient states she is not having any pain at this time.   0647 end of shift report given. Orders verified.

## 2023-10-03 NOTE — NURSING NOTE
Report received. Assumed care of patient. Patient resting in bed.   0604 patient sleeping, woke to nurses voice. Declined morning medications. Explained to patient what the capsaicin is used for. Patient states she has no pain at this time.

## 2023-10-03 NOTE — PROGRESS NOTES
"Yesterday I spoke with oncology, and we discussed the trapped lung and that we have no further options therapeutically from a respiratory standpoint.  Oncology concurs that we are dealing with a hospice situation where comfort should be our focus.  Patient met with hospice and then I met with the patient and family following that.  She is somewhat reluctant to go on hospice because \"palliative care should be enough\".  I did discuss with the patient family I think hospice is a much better option and that we can get her home and keeping her comfortable as possible.  We also discussed whether to continue the Pleurx catheter.  Given that the lung is trapped I am not sure I see any significant benefit to the catheter but the patient seems to think that she would like to keep it, but again will discuss with her family.  Discussed with hospice nurse as well.  Will see tomorrow.    "

## 2023-10-03 NOTE — PROGRESS NOTES
Spiritual Care Visit    Clinical Encounter Type  Visited With: Patient  Routine Visit: Follow-up  Continue Visiting: Yes         Values/Beliefs  Spiritual Requests During Hospitalization: Communioon & visit today    Sacramental Encounters  Communion: Patient wants communion  Ranjit Sibley

## 2023-10-04 PROBLEM — R76.8 POSITIVE CMV IGG SEROLOGY: Status: ACTIVE | Noted: 2023-01-01

## 2023-10-04 PROBLEM — E55.9 VITAMIN D DEFICIENCY: Status: ACTIVE | Noted: 2023-01-01

## 2023-10-04 PROBLEM — J41.0 SIMPLE CHRONIC BRONCHITIS (MULTI): Status: ACTIVE | Noted: 2023-01-01

## 2023-10-04 PROBLEM — F17.200 SMOKER: Status: ACTIVE | Noted: 2023-01-01

## 2023-10-04 PROBLEM — N95.2 POSTMENOPAUSAL ATROPHIC VAGINITIS: Status: ACTIVE | Noted: 2023-01-01

## 2023-10-04 PROBLEM — N39.43 POST-VOID DRIBBLING: Status: ACTIVE | Noted: 2023-01-01

## 2023-10-04 PROBLEM — D51.9 ANEMIA DUE TO VITAMIN B12 DEFICIENCY: Status: ACTIVE | Noted: 2023-01-01

## 2023-10-04 PROBLEM — J44.9 CHRONIC OBSTRUCTIVE LUNG DISEASE (MULTI): Status: ACTIVE | Noted: 2023-01-01

## 2023-10-04 PROBLEM — R93.89 ABNORMAL COMPUTED TOMOGRAPHY SCAN: Status: ACTIVE | Noted: 2023-01-01

## 2023-10-04 PROBLEM — D64.9 ANEMIA: Status: ACTIVE | Noted: 2023-01-01

## 2023-10-04 PROBLEM — F32.0 MAJOR DEPRESSIVE DISORDER, SINGLE EPISODE, MILD (CMS-HCC): Status: ACTIVE | Noted: 2023-01-01

## 2023-10-04 PROBLEM — M94.0 COSTOCHONDRITIS: Status: ACTIVE | Noted: 2023-01-01

## 2023-10-04 PROBLEM — R53.83 FATIGUE: Status: ACTIVE | Noted: 2023-01-01

## 2023-10-04 PROBLEM — R63.4 UNEXPLAINED WEIGHT LOSS: Status: ACTIVE | Noted: 2023-01-01

## 2023-10-04 PROBLEM — R20.2 PARESTHESIA OF FOOT: Status: ACTIVE | Noted: 2023-01-01

## 2023-10-04 PROBLEM — J43.8 OTHER EMPHYSEMA (MULTI): Status: ACTIVE | Noted: 2023-01-01

## 2023-10-04 PROBLEM — K57.92 DIVERTICULITIS: Status: ACTIVE | Noted: 2023-01-01

## 2023-10-04 PROBLEM — G47.10 HYPERSOMNOLENCE: Status: ACTIVE | Noted: 2023-01-01

## 2023-10-04 PROBLEM — N18.30 STAGE 3 CHRONIC KIDNEY DISEASE (MULTI): Status: ACTIVE | Noted: 2023-01-01

## 2023-10-04 PROBLEM — K57.32 DIVERTICULITIS OF SIGMOID COLON: Status: ACTIVE | Noted: 2023-01-01

## 2023-10-04 PROBLEM — R76.8 EBV SEROPOSITIVITY: Status: ACTIVE | Noted: 2023-01-01

## 2023-10-04 PROBLEM — F41.9 ANXIETY: Status: ACTIVE | Noted: 2023-01-01

## 2023-10-04 PROBLEM — R89.9 ABNORMAL LABORATORY TEST: Status: ACTIVE | Noted: 2023-01-01

## 2023-10-04 PROBLEM — J94.8 NEOPLASTIC PLEURAL EFFUSION: Status: ACTIVE | Noted: 2023-01-01

## 2023-10-04 PROBLEM — J91.0 MALIGNANT PLEURAL EFFUSION (CMS-HCC): Status: ACTIVE | Noted: 2023-01-01

## 2023-10-04 PROBLEM — R55 SYNCOPAL EPISODES: Status: ACTIVE | Noted: 2023-01-01

## 2023-10-04 PROBLEM — R26.89 IMBALANCE: Status: ACTIVE | Noted: 2023-01-01

## 2023-10-04 PROBLEM — N95.1 FEMALE CLIMACTERIC STATE: Status: ACTIVE | Noted: 2023-01-01

## 2023-10-04 PROBLEM — C34.90 NON-SMALL CELL LUNG CANCER (MULTI): Status: ACTIVE | Noted: 2023-01-01

## 2023-10-04 PROBLEM — H93.13 TINNITUS OF BOTH EARS: Status: ACTIVE | Noted: 2023-01-01

## 2023-10-04 PROBLEM — G25.2 INTENTION TREMOR: Status: ACTIVE | Noted: 2023-01-01

## 2023-10-04 PROBLEM — F17.210 DEPENDENCE ON NICOTINE FROM CIGARETTES: Status: ACTIVE | Noted: 2023-01-01

## 2023-10-04 PROBLEM — B27.09 DISORDER DUE TO EPSTEIN-BARR VIRUS (EBV): Status: ACTIVE | Noted: 2023-01-01

## 2023-10-04 PROBLEM — J98.59 MEDIASTINAL MASS: Status: ACTIVE | Noted: 2023-01-01

## 2023-10-04 PROBLEM — Z98.890 POSTPROCEDURAL STATE: Status: ACTIVE | Noted: 2023-01-01

## 2023-10-04 PROBLEM — R73.09 ELEVATED GLUCOSE: Status: ACTIVE | Noted: 2023-01-01

## 2023-10-04 PROBLEM — G47.33 OSA (OBSTRUCTIVE SLEEP APNEA): Status: ACTIVE | Noted: 2023-01-01

## 2023-10-04 PROBLEM — F17.200 TOBACCO DEPENDENCE: Status: ACTIVE | Noted: 2023-01-01

## 2023-10-04 NOTE — PROGRESS NOTES
Chika Ford is a 78 y.o. female on day 0 of admission presenting with Pneumothorax.    Subjective   Dc plan is for pt to go home with [facility]. Family is ready to sign. [facility] does not have an RN that can come out today. [facility] is going to meet with the family at bedside tomorrow between 1:30-2:00pm. MSW spoke with scheduling supervisor Adam Gutierrez.   Assessment/Plan   Principal Problem:    Pneumothorax  Active Problems:    Primary adenocarcinoma of lung, right (CMS/HCC)    Dyspnea    Cough with hemoptysis               Fouzia Leon, KELLENA, LSW

## 2023-10-04 NOTE — PROGRESS NOTES
Chika Ford is a 78 y.o. female on day 0 of admission presenting with Pneumothorax.    Subjective   Patient seen and examined.  Resting in bed in no acute distress.  Daughter at bedside.   Awake alert oriented x 3.  No new complaints.  +Shortness of breath improved.  No cough.  No pain.  Anxiety controlled.  Fatigue.  Poor appetite.  Met with [facility].   Waiting to speak with Hem/Onc prior to making a decision     Objective     Physical Exam  Vitals reviewed.   Constitutional:       Comments: Frail appearing  female   HENT:      Head: Normocephalic and atraumatic.      Right Ear: Tympanic membrane normal.      Left Ear: Tympanic membrane normal.      Nose: Nose normal.      Mouth/Throat:      Mouth: Mucous membranes are moist.      Pharynx: Oropharynx is clear.   Eyes:      Extraocular Movements: Extraocular movements intact.      Conjunctiva/sclera: Conjunctivae normal.      Pupils: Pupils are equal, round, and reactive to light.   Cardiovascular:      Rate and Rhythm: Normal rate and regular rhythm.      Pulses: Normal pulses.      Heart sounds: Normal heart sounds.   Pulmonary:      Effort: Pulmonary effort is normal.      Breath sounds: Normal breath sounds.      Comments: Lung sounds diminished throughout  On supplemental oxygen 2 liters nasal cannula - no home oxygen  Right pleurx in place dressing clean dry intact  Abdominal:      General: Bowel sounds are normal.      Palpations: Abdomen is soft.   Genitourinary:     Comments: Rectal examination deferred  Musculoskeletal:         General: Normal range of motion.      Cervical back: Normal range of motion and neck supple.   Skin:     General: Skin is warm and dry.      Capillary Refill: Capillary refill takes less than 2 seconds.      Comments: Right pleurx in place with dressing clean dry intact   Neurological:      General: No focal deficit present.      Mental Status: She is alert and oriented to person, place, and time.       "Comments: Voice soft  Generalized weakness no focal weakness  Gait deferred   Psychiatric:         Mood and Affect: Mood normal.         Behavior: Behavior normal.         Last Recorded Vitals  Blood pressure 112/52, pulse 69, temperature 36.5 °C (97.7 °F), temperature source Oral, resp. rate 16, height 1.651 m (5' 5\"), weight 50.8 kg (111 lb 15.9 oz), SpO2 100 %.  Intake/Output last 3 Shifts:  I/O last 3 completed shifts:  In: 400 (7.9 mL/kg) [P.O.:400]  Out: 950 (18.7 mL/kg) [Urine:950 (0.5 mL/kg/hr)]  Weight: 50.8 kg     Scheduled medications  ascorbic acid, 500 mg, oral, Daily  calcium carbonate-vitamin D3, 1 tablet, oral, TID with meals  capsaicin, 1 Application, Topical, q8h  cyanocobalamin, 500 mcg, oral, Daily  ferrous sulfate, 65 mg of iron, oral, Daily with breakfast  tiotropium, 2 puff, inhalation, Daily   And  fluticasone furoate-vilanteroL, 1 puff, inhalation, Daily  folic acid, 1 mg, oral, Daily  heparin (porcine), 5,000 Units, subcutaneous, q8h  oxygen, , inhalation, q8h  psyllium, 1 packet, oral, Daily      Continuous medications     PRN medications  PRN medications: acetaminophen, albuterol, ALPRAZolam, bisacodyl, diclofenac, melatonin, morphine        Malnutrition Diagnosis Status: New  Malnutrition Diagnosis: Severe malnutrition related to disease or condition  As Evidenced by: po intake <75% > 1 month, 15.3% weight loss in < 6 months, moderate subcutaneous fat and muscle deficits  I agree with the dietitian's malnutrition diagnosis.    Assessment/Plan   Principal Problem:    Pneumothorax  Active Problems:    Primary adenocarcinoma of lung, right (CMS/HCC)    Dyspnea    Cough with hemoptysis  Severe protein calorie malnutrition  Anxiety    PLAN:  Consultations input appreciated.  [facility] informational meeting complete.  Patient and daughter awaiting conversation with Hem/Onc prior to making a decision.  Palliative medicine is following.  Continue symptom control.  She is a DNRCCA " DNI.  Pleurx catheter remains in place.  Management per Pulmonology.  Diet as tolerated.  Offered protein supplementation she declines at this time.  Supportive care.  Patient and daughter reassured.  Case management following for discharge planning.  Anticipate discharge home, pending decision.  Discussed with Dr. Mckeon.    Yuni Love, LISSA-CNP

## 2023-10-04 NOTE — PROGRESS NOTES
Spiritual Care Visit    Clinical Encounter Type  Visited With: Patient  Routine Visit: Follow-up  Continue Visiting: Yes         Values/Beliefs  Spiritual Requests During Hospitalization: Communion    Sacramental Encounters  Communion: Patient wants communion  Communion Given Indicator: Yes    Ranjit Sibley

## 2023-10-04 NOTE — CARE PLAN
Problem: Bathing  Goal: STG - Patient will perform UB/LB bathing at mod I  Outcome: Progressing     Problem: Dressings Lower Extremities  Goal: STG - Patient to complete lower body dressing at mod I with AE prn  Outcome: Progressing     Problem: Grooming  Goal: STG - Patient completes grooming tasks at mod I  Outcome: Progressing     Problem: Mobility  Goal: Pt will perform functional mobility short household distance with DME prn  Outcome: Progressing     Problem: Toileting  Goal: STG - Patient will complete toileting tasks at mod I with DME prn  Outcome: Progressing

## 2023-10-04 NOTE — PROGRESS NOTES
Occupational Therapy    Evaluation/Treatment    Patient Name: Chika Ford  MRN: [id number]  : 1945  Today's Date: 10/04/23  Time Calculation  Start Time: 949  Stop Time:   Time Calculation (min): 19 min       Assessment:  Prognosis: Fair  Evaluation/Treatment Tolerance: Patient limited by fatigue  Medical Staff Made Aware: Yes (pt seated EOB at end of session, all needs in reach, RN aware)  OT Assessment Results: Decreased ADL status, Decreased upper extremity strength, Decreased endurance, Decreased functional mobility, Decreased IADLs  Prognosis: Fair  Evaluation/Treatment Tolerance: Patient limited by fatigue  Medical Staff Made Aware: Yes (pt seated EOB at end of session, all needs in reach, RN aware)  Strengths: Ability to acquire knowledge, Support of extended family/friends  Plan:  Treatment Interventions: ADL retraining, Endurance training, Patient/family training, Equipment evaluation/education, Functional transfer training  OT Frequency: 3 times per week  OT Discharge Recommendations: Low intensity level of continued care (with  assist pending progress)  OT - OK to Discharge:  (will continued OT services)  Treatment Interventions: ADL retraining, Endurance training, Patient/family training, Equipment evaluation/education, Functional transfer training    Subjective   Current Problem:  1. Shortness of breath        2. Hydropneumothorax        3. Primary adenocarcinoma of lung, right (CMS/HCC)          General:   OT Received On: 10/04/23  General  Reason for Referral: Impaired mobility  Referred By: GIO Love CNP  Past Medical History Relevant to Rehab: non-small cell lung cancer, pleurex cath, thoracentesis, recurrent malignant pleural effusion,  Prior to Session Communication: Bedside nurse  Patient Position Received: Bed, 1 rail up  General Comment: Pt cleared by nursing. Pt supine in bed upon arrival. Pt pleasant and agreeable to therapy.  Precautions:  Medical Precautions: Fall  precautions  Vital Signs:  Heart Rate: 107  SpO2: 100 %  Pain:  Pain Assessment  Pain Assessment: 0-10  Pain Score: 0 - No pain    Objective   Cognition:  Overall Cognitive Status: Within Functional Limits           Home Living:  Type of Home: House  Lives With: Alone  Home Adaptive Equipment:  (bath bench)  Home Layout: Multi-level  Home Access: Stairs to enter with rails  Entrance Stairs-Rails: None  Entrance Stairs-Number of Steps: 1  Bathroom Shower/Tub: Tub/shower unit  Bathroom Equipment: Grab bars in shower  Prior Function:  Level of [place]: Independent with ADLs and functional transfers, Independent with homemaking with ambulation  Receives Help From: Family  ADL Assistance: Independent  Homemaking Assistance: Independent  Ambulatory Assistance: Independent  Hand Dominance: Right  IADL History:  Mode of Transportation:  (family provides transportation)  ADL:  Eating Assistance: Independent  Grooming Assistance: Stand by  Bathing Assistance: Minimal  UE Dressing Assistance: Independent  LE Dressing Assistance: Minimal  Toileting Assistance with Device: Minimal  Functional Assistance: Minimal    Activity Tolerance:  Endurance: Decreased tolerance for upright activites  Activity Tolerance Comments: session limited by pts tolerance to activity, reports increased SOB with mobility  Functional Standing Tolerance:     Bed Mobility/Transfers: Bed Mobility  Bed Mobility: Yes  Bed Mobility 1  Bed Mobility 1: Supine to sitting  Level of Assistance 1: Close supervision  Bed Mobility Comments 1:  (x2 trials)  Bed Mobility 2  Bed Mobility  2: Sitting to supine  Level of Assistance 2: Close supervision  Bed Mobility Comments 2: x2 trials    Transfers  Transfer:  (pt unable to tolerated this date)             Vision:Vision - Basic Assessment  Current Vision: Wears glasses only for reading  Sensation:  Light Touch: No apparent deficits  Strength:  Strength Comments:  (UE MMT not formally tested secondary to CA)  Other  Activity:     Home Environment:     Perception:     Coordination:      Hand Function:  Hand Function  Gross Grasp: Functional  Coordination: Functional  Extremities: RUE   RUE : Within Functional Limits and LUE   LUE: Within Functional Limits    Outcome Measures: [facility] Daily Activity  Putting on and taking off regular lower body clothing: A little  Bathing (including washing, rinsing, drying): A little  Putting on and taking off regular upper body clothing: A little  Toileting, which includes using toilet, bedpan or urinal: A little  Taking care of personal grooming such as brushing teeth: A little  Eating Meals: None  Daily Activity - Total Score: 19        Education Documentation  Body Mechanics, taught by Danita Pham OT at 10/4/2023 12:14 PM.  Learner: Patient  Readiness: Acceptance  Method: Demonstration, Explanation  Response: Needs Reinforcement    ADL Training, taught by Danita Pham OT at 10/4/2023 12:14 PM.  Learner: Patient  Readiness: Acceptance  Method: Demonstration, Explanation  Response: Needs Reinforcement    Education Comments  No comments found.           Goals:    Problem: Bathing  Goal: STG - Patient will perform UB/LB bathing at mod I  Outcome: Progressing     Problem: Dressings Lower Extremities  Goal: STG - Patient to complete lower body dressing at mod I with AE prn  Outcome: Progressing     Problem: Grooming  Goal: STG - Patient completes grooming tasks at mod I  Outcome: Progressing     Problem: Mobility  Goal: Pt will perform functional mobility short household distance with DME prn  Outcome: Progressing     Problem: Toileting  Goal: STG - Patient will complete toileting tasks at mod I with DME prn  Outcome: Progressing

## 2023-10-04 NOTE — CARE PLAN
Problem: Nutrition  Goal: Oral intake greater than 50%  Outcome: Progressing  Goal: Oral intake greater 75%  Outcome: Progressing  Goal: Consume prescribed supplement  Outcome: Progressing  Goal: Adequate PO fluid intake  Outcome: Progressing  Goal: Nutrition support goals are met within 48 hrs  Outcome: Progressing  Goal: BG  mg/dL  Outcome: Progressing  Goal: Lab values WNL  Outcome: Progressing  Goal: Electrolytes WNL  Outcome: Progressing  Goal: Promote healing  Outcome: Progressing  Goal: Maintain stable weight  Outcome: Progressing     Problem: Pain - Adult  Goal: Verbalizes/displays adequate comfort level or baseline comfort level  Outcome: Progressing     Problem: Safety - Adult  Goal: Free from fall injury  Outcome: Progressing     Problem: Discharge Planning  Goal: Discharge to home or other facility with appropriate resources  Outcome: Progressing     Problem: Chronic Conditions and Co-morbidities  Goal: Patient's chronic conditions and co-morbidity symptoms are monitored and maintained or improved  Outcome: Progressing     Problem: Fall/Injury  Goal: Not fall by end of shift  Outcome: Progressing  Goal: Be free from injury by end of the shift  Outcome: Progressing  Goal: Verbalize understanding of personal risk factors for fall in the hospital  Outcome: Progressing  Goal: Verbalize understanding of risk factor reduction measures to prevent injury from fall in the home  Outcome: Progressing  Goal: Use assistive devices by end of the shift  Outcome: Progressing  Goal: Pace activities to prevent fatigue by end of the shift  Outcome: Progressing     Problem: Respiratory  Goal: Minimize anxiety/maximize coping throughout shift  Outcome: Progressing  Goal: No signs of respiratory distress (eg. Use of accessory muscles. Peds grunting)  Outcome: Progressing   The patient's goals for the shift include breath better    The clinical goals for the shift include safety

## 2023-10-04 NOTE — PROGRESS NOTES
Physical Therapy    Physical Therapy Evaluation    Patient Name: Chika Ford  MRN: [id number]  Today's Date: 10/4/2023   Time Calculation  Start Time: 0945  Stop Time: 1005  Time Calculation (min): 20 min    Assessment/Plan   PT Assessment  PT Assessment Results: Decreased strength, Decreased endurance, Impaired balance, Decreased mobility  Rehab Prognosis: Good  Evaluation/Treatment Tolerance: Other (Comment) (tolerance to activity limited by SOB)  End of Session Communication: Bedside nurse  End of Session Patient Position: Bed, 1 rail up  IP OR SWING BED PT PLAN  Inpatient or Swing Bed: Inpatient  PT Plan  Treatment/Interventions: Bed mobility, Transfer training, Gait training, Stair training, Balance training, Endurance training  PT Frequency: 3 times per week  PT Discharge Recommendations: Low intensity level of continued care (Pending definitive medical goals of care)  PT Recommended Transfer Status: Stand by assist  PT - OK to Discharge: Yes      Subjective   General Visit Information:  General  Reason for Referral: Impaired mobility  Referred By: GIO Love CNP  Past Medical History Relevant to Rehab: non-small cell lung cancer, pleurex cath, thoracentesis, recurrent malignant pleural effusion,  Family/Caregiver Present: No  Prior to Session Communication: Bedside nurse  Patient Position Received: Bed, 1 rail up  Home Living:  Home Living  Type of Home: House  Lives With: Alone  Home Adaptive Equipment:  (bathbench)  Home Layout: Multi-level  Home Access: Stairs to enter with rails  Entrance Stairs-Rails: None  Entrance Stairs-Number of Steps: 1  Bathroom Shower/Tub: Tub/shower unit  Bathroom Equipment: Grab bars in shower  Prior Level of Function:  Prior Function Per Pt/Caregiver Report  Level of [place]: Independent with ADLs and functional transfers, Independent with homemaking with ambulation (Prior to recent hospital admits)  Receives Help From: Family (Daughter and  son-in-law)  Precautions:  Precautions  Medical Precautions: Fall precautions  Vital Signs:  Vital Signs  SpO2: 100 % (O2 at 2 liters via nasal cannula)    Objective   Pain:     Cognition:  Cognition  Overall Cognitive Status:  (Intermittently tearful;  appears discouraged regarding decreased functional mobility)    General Assessments:  General Observation  General Observation: Increased time and effort required to mobilize; frequent rest periods required during mobility             Activity Tolerance  Endurance: Decreased tolerance for upright activites (SOB at rest while sitting on side of bed)  Activity Tolerance Comments: tolerance to activity limited by SOB and fatigue    Sensation  Light Touch: No apparent deficits    Coordination  Movements are Fluid and Coordinated: No  Lower Body Coordination: Slower rate of movement gloria LE    Static Sitting Balance  Static Sitting-Balance Support: Bilateral upper extremity supported  Static Sitting-Level of Assistance: Close supervision  Static Sitting-Comment/Number of Minutes: Patient SOB at rest while sitting on side of bed  Dynamic Sitting Balance  Dynamic Sitting-Balance Support: Bilateral upper extremity supported       Functional Assessments:  Bed Mobility  Bed Mobility: Yes  Bed Mobility 1  Bed Mobility 1: Supine to sitting  Level of Assistance 1: Close supervision  Bed Mobility Comments 1: Increased time and effort required to achieve supine to sit            Extremity/Trunk Assessments:  RLE   RLE : Within Functional Limits  LLE   LLE : Within Functional Limits  Outcome Measures:  [facility] Basic Mobility  Turning from your back to your side while in a flat bed without using bedrails: None  Moving from lying on your back to sitting on the side of a flat bed without using bedrails: None  Moving to and from bed to chair (including a wheelchair): A little  Standing up from a chair using your arms (e.g. wheelchair or bedside chair): A little  To walk in hospital room: A  little  Climbing 3-5 steps with railing: A little  Basic Mobility - Total Score: 20    Encounter Problems       Encounter Problems (Active)       PT Problem       Patient will ambulate 50 distance using walker with modified independent (Not Progressing)       Start:  10/04/23    Expected End:  10/18/23         Goal Note       Patient will ambulate 50 distance using walker with modified independent              Patient will ambulate up and down a curb/single step with minimal assist using no device (Not Progressing)       Start:  10/04/23    Expected End:  10/18/23         Goal Note       Patient will ambulate up and down a curb/single step with minimal assist using no device              Patient will perform chair to and from bed transfer with independent (Not Progressing)       Start:  10/04/23    Expected End:  10/18/23         Goal Note       Patient will perform chair to and from bed transfer with independent                 Pain - Adult              Education Documentation  No documentation found.  Education Comments  No comments found.

## 2023-10-04 NOTE — PROGRESS NOTES
SW met with patient on 9/28/23 and spoke to Pt and daughter about hospice.  They would like a palliative care referral made to [facility].  10/4/23 SW collected information and faxed over referral.  Family called for notification.

## 2023-10-04 NOTE — PROGRESS NOTES
"Chika Ford is a 78 y.o. female on day 0 of admission presenting with Pneumothorax.    Subjective   Patient seen in follow-up for dyspnea.  On 2 L nasal cannula oxygen; O2 sats 99%.  Endorses dyspnea and fatigue.  Denies pain.  Afebrile.      Objective     Physical Exam  Vitals and nursing note reviewed.   Constitutional:       Appearance: Normal appearance. She is ill-appearing.   HENT:      Head: Normocephalic and atraumatic.      Nose: Nose normal.      Mouth/Throat:      Mouth: Mucous membranes are moist.   Eyes:      Extraocular Movements: Extraocular movements intact.      Conjunctiva/sclera: Conjunctivae normal.      Pupils: Pupils are equal, round, and reactive to light.   Cardiovascular:      Rate and Rhythm: Normal rate and regular rhythm.      Pulses: Normal pulses.      Heart sounds: Normal heart sounds.   Pulmonary:      Effort: Pulmonary effort is normal.      Breath sounds: Normal breath sounds.      Comments: Right side diminished but clear  Abdominal:      General: Bowel sounds are normal.      Palpations: Abdomen is soft.   Musculoskeletal:         General: Normal range of motion.   Skin:     General: Skin is warm and dry.      Capillary Refill: Capillary refill takes less than 2 seconds.   Neurological:      General: No focal deficit present.      Mental Status: She is alert and oriented to person, place, and time.   Psychiatric:         Mood and Affect: Mood normal.         Behavior: Behavior normal.       Last Recorded Vitals  Blood pressure 115/57, pulse 90, temperature 36.6 °C (97.9 °F), temperature source Oral, resp. rate 18, height 1.651 m (5' 5\"), weight 50.8 kg (111 lb 15.9 oz), SpO2 100 %.  Intake/Output last 3 Shifts:  I/O last 3 completed shifts:  In: 400 (7.9 mL/kg) [P.O.:400]  Out: 950 (18.7 mL/kg) [Urine:950 (0.5 mL/kg/hr)]  Weight: 50.8 kg     Relevant Results           XR chest 2 views    Result Date: 10/1/2023  Large right-sided pneumothorax which is not significantly changed. " Right PleurX catheter in stable position in the right lung base. Slight interval decrease in degree of right pleural fluid however there is moderate right pleural fluid remaining. The component of pneumothorax is not significantly changed. Associated atelectatic changes of the right lung, unchanged. The left hemithorax remains clear. No left pleural effusion. The cardiomediastinal silhouette is unremarkable.   The osseous structures are intact.            Assessment/Plan   Hydropneumothorax  Malignant pleural effusion  Metastatic non-small cell carcinoma    Continue nasal cannula oxygen  Pulmonary hygiene  Surgery not indicated per CTS, access PleurX catheter and continue to drain every Monday Wednesday Friday  Palliative Medicine following  CODE STATUS: DNR CCA DNI  Hospice referral called for informational being             I spent 15 minutes in the professional and overall care of this patient.      Demetra Narayanan, APRN-CNP

## 2023-10-04 NOTE — CARE PLAN
Problem: PT Problem  Goal: Patient will ambulate 50 distance using walker with modified independent  Outcome: Not Progressing  Note: Patient will ambulate 50 distance using walker with modified independent  Goal: Patient will ambulate up and down a curb/single step with minimal assist using no device  Outcome: Not Progressing  Note: Patient will ambulate up and down a curb/single step with minimal assist using no device  Goal: Patient will perform chair to and from bed transfer with independent  Outcome: Not Progressing  Note: Patient will perform chair to and from bed transfer with independent     Problem: PT Problem  Goal: Patient will ambulate 50 distance using walker with modified independent  Outcome: Not Progressing  Note: Patient will ambulate 50 distance using walker with modified independent  Goal: Patient will ambulate up and down a curb/single step with minimal assist using no device  Outcome: Not Progressing  Note: Patient will ambulate up and down a curb/single step with minimal assist using no device  Goal: Patient will perform chair to and from bed transfer with independent  Outcome: Not Progressing  Note: Patient will perform chair to and from bed transfer with independent

## 2023-10-05 NOTE — PROGRESS NOTES
Chika Ford is a 78 y.o. female on day 0 of admission presenting with Pneumothorax.    Subjective   Pt has a meeting scheduled with SUJEY at 1:30-2pm. Anticipated pt will sign and then return home with hospice once arrangements are made.        Objective     Secure a safe dc plan home with YANELI Rebolledo, LSW

## 2023-10-05 NOTE — PROGRESS NOTES
"Subjective   Patient seen in follow-up for dyspnea.  On 2 L  nasal cannula oxygen; O2 sats 99%.  Endorses dyspnea and fatigue.  Denies pain.  Afebrile.       Objective     Physical Exam  Vitals and nursing note reviewed.   Constitutional:       Appearance: Normal appearance. She is ill-appearing.   HENT:      Head: Normocephalic and atraumatic.      Nose: Nose normal.      Mouth/Throat:      Mouth: Mucous membranes are moist.   Eyes:      Extraocular Movements: Extraocular movements intact.      Conjunctiva/sclera: Conjunctivae normal.      Pupils: Pupils are equal, round, and reactive to light.   Cardiovascular:      Rate and Rhythm: Normal rate and regular rhythm.      Pulses: Normal pulses.      Heart sounds: Normal heart sounds.   Pulmonary:      Effort: Pulmonary effort is normal.      Breath sounds: Normal breath sounds.   Abdominal:      General: Bowel sounds are normal.      Palpations: Abdomen is soft.   Musculoskeletal:         General: Normal range of motion.   Skin:     General: Skin is warm and dry.      Capillary Refill: Capillary refill takes less than 2 seconds.   Neurological:      General: No focal deficit present.      Mental Status: She is alert and oriented to person, place, and time.   Psychiatric:         Mood and Affect: Mood normal.         Behavior: Behavior normal.         Last Recorded Vitals  Blood pressure 129/61, pulse 74, temperature 36.6 °C (97.9 °F), temperature source Oral, resp. rate 17, height 1.651 m (5' 5\"), weight 98.4 kg (216 lb 14.9 oz), SpO2 98 %.  Intake/Output last 3 Shifts:  I/O last 3 completed shifts:  In: 840 (8.5 mL/kg) [P.O.:840]  Out: 900 (9.1 mL/kg) [Urine:350 (0.1 mL/kg/hr); Chest Tube:550]  Weight: 98.4 kg     Relevant Results            Assessment/Plan   Hydropneumothorax  Malignant pleural effusion  Metastatic non-small cell carcinoma     Continue nasal cannula oxygen  Pulmonary hygiene  Surgery not indicated per CTS, access PleurX catheter and continue to " drain every Monday Wednesday Friday  Palliative Medicine following  CODE STATUS: DNR CCA DNI  Hospice informational meeting yesterday         I spent 15 minutes in the professional and overall care of this patient.      Demetra Narayanan, LISSA-CNP

## 2023-10-05 NOTE — NURSING NOTE
RN Hospice Note    Chika Ford is a Hospice Patient.   Hospice terminal diagnosis: Lung  Cancer  Physician: Mike  Visit type: Admission Initial [place] Pt    Comments/recommendations: Met with patient and daughter Siobhan.  Patient in agreement with hospice now as she feels she can benefit from the support.  Patient signed her own consents.  Oxygen ordered for delivery today to her home.  Discussed support in place at home.  Reviewed hospice services and support.  Patient requests Morphine and Xanax for at home at discharge.  Message left for Dr. Mckeon and Yuni Delaney    Discharge Planning:  Patient to be discharged to home    The following is to be completed:  Discharge order: Y  State DNR signed by MD: [facility] [place] referral/transfer form:    Medication reconciliation: Y   PAS/RR or convalescent stay form:    Prescriptions for al narcotics/new medications: Y  Transportation: N  Other:      Plan of care reviewed with patient/family members Siobhan, dtr, and patient   Plan of care reviewed with hospital staff members: Bedside nurse, Dr. Mckeon, Palliative Care Team, Rebecca NP     Please notify [facility] of any changes in condition. Thank you.  Office: [phone] (8 am-6:30 pm M-F and 8 am-4:30 pm weekends and holidays)   [phone] (6:30 pm-8 am M-F and 4:30 pm-8 am weekends and holidays)    Jie Lopez RN

## 2023-10-05 NOTE — PROGRESS NOTES
Spiritual Care Visit    Clinical Encounter Type  Visited With: Patient  Routine Visit: Follow-up  Continue Visiting: Yes         Values/Beliefs  Spiritual Requests During Hospitalization: Communion today    Sacramental Encounters  Communion: Patient wants communion  Communion Given Indicator: Yes    Ranjit Sibley

## 2023-10-05 NOTE — NURSING NOTE
Assumed care of patient. Patient resting with call light in reach and shows no s/s of distress at this time.

## 2023-10-05 NOTE — NURSING NOTE
Pt resting quielty in bed, denies any needs, pt finishing up blizzard from dq that dtr brought for pt

## 2023-10-05 NOTE — CARE PLAN
The patient's goals for the shift include breath better    The clinical goals for the shift include safety    Over the shift, the patient did not make progress toward the following goals. Barriers to progression include . Recommendations to address these barriers include .

## 2023-10-05 NOTE — PROGRESS NOTES
Chika Ford is a 78 y.o. female on day 5 of admission presenting with Pneumothorax.    Subjective   Patient seen and examined.  Resting in bed in no acute distress.  Awake alert and oriented x3.  No new complaints.  Dyspnea on exertion.  No cough.  No chest pain.  Anxiety controlled.  Fatigue unchanged.  Poor appetite.  [facility] meeting scheduled tomorrow.    Objective     Physical Exam  Vitals reviewed.   Constitutional:       Comments: Frail appearing  female resting in bed in no acute distress   HENT:      Head: Normocephalic and atraumatic.      Right Ear: Tympanic membrane normal.      Left Ear: Tympanic membrane normal.      Nose: Nose normal.      Mouth/Throat:      Mouth: Mucous membranes are moist.      Pharynx: Oropharynx is clear.   Eyes:      Extraocular Movements: Extraocular movements intact.      Conjunctiva/sclera: Conjunctivae normal.      Pupils: Pupils are equal, round, and reactive to light.   Cardiovascular:      Rate and Rhythm: Normal rate and regular rhythm.      Pulses: Normal pulses.      Heart sounds: Normal heart sounds.   Pulmonary:      Effort: Pulmonary effort is normal.      Breath sounds: Normal breath sounds.      Comments: Lung sounds diminished throughout.  On supplemental oxygen 2 L nasal cannula no home oxygen.  Right Pleurx in place dressing clean dry and intact  Abdominal:      General: Bowel sounds are normal.      Palpations: Abdomen is soft.   Genitourinary:     Comments: Rectal examination deferred  Musculoskeletal:         General: Normal range of motion.      Cervical back: Normal range of motion and neck supple.   Skin:     General: Skin is warm and dry.      Capillary Refill: Capillary refill takes less than 2 seconds.   Neurological:      General: No focal deficit present.      Mental Status: She is alert and oriented to person, place, and time.      Comments: Soft.  Generalized weakness.  No focal weakness   Psychiatric:         Mood and  "Affect: Mood normal.         Behavior: Behavior normal.       Last Recorded Vitals  Blood pressure 99/59, pulse 93, temperature 36.4 °C (97.5 °F), temperature source Oral, resp. rate (!) 28, height 1.651 m (5' 5\"), weight 98.4 kg (216 lb 14.9 oz), SpO2 95 %.  Intake/Output last 3 Shifts:  I/O last 3 completed shifts:  In: 840 (8.5 mL/kg) [P.O.:840]  Out: 900 (9.1 mL/kg) [Urine:350 (0.1 mL/kg/hr); Chest Tube:550]  Weight: 98.4 kg     Relevant Results  Scheduled medications  ascorbic acid, 500 mg, oral, Daily  calcium carbonate-vitamin D3, 1 tablet, oral, TID with meals  capsaicin, 1 Application, Topical, q8h  cyanocobalamin, 500 mcg, oral, Daily  ferrous sulfate, 65 mg of iron, oral, Daily with breakfast  tiotropium, 2 puff, inhalation, Daily   And  fluticasone furoate-vilanteroL, 1 puff, inhalation, Daily  folic acid, 1 mg, oral, Daily  heparin (porcine), 5,000 Units, subcutaneous, q8h  oxygen, , inhalation, q8h  psyllium, 1 packet, oral, Daily      Continuous medications     PRN medications  PRN medications: acetaminophen, albuterol, ALPRAZolam, bisacodyl, diclofenac, melatonin, morphine    No results found for this or any previous visit (from the past 24 hour(s)).    No results found.      Malnutrition Diagnosis Status: New  Malnutrition Diagnosis: Severe malnutrition related to disease or condition  As Evidenced by: po intake <75% > 1 month, 15.3% weight loss in < 6 months, moderate subcutaneous fat and muscle deficits  I agree with the dietitian's malnutrition diagnosis.    Assessment/Plan   Principal Problem:    Pneumothorax  Active Problems:    Primary adenocarcinoma of lung, right (CMS/HCC)    Dyspnea    Cough with hemoptysis  Severe protein calorie malnutrition  Anxiety    PLAN:  Patient is doing okay this afternoon.  No new issues.  Consultations input appreciated.  She plans to sign with [facility] - follow up meeting tomorrow.  Palliative medicine following.  Continue symptom control.  DNR " CCA DNI.  Pleurx catheter remains in place.  Management per Pulmonology.  Diet as tolerated.  Supportive care.  Patient and daughter reassured.  Case management following for discharge planning.  Anticipate discharge home with [facility] when arrangements are made.  Discussed with Dr. Mckeon.    LISSA Jeronimo-CNP

## 2023-10-05 NOTE — CARE PLAN
Problem: Respiratory  Goal: Minimize anxiety/maximize coping throughout shift  Outcome: Progressing  Goal: No signs of respiratory distress (eg. Use of accessory muscles. Peds grunting)  Outcome: Progressing

## 2023-10-06 PROBLEM — K59.00 CONSTIPATION: Status: ACTIVE | Noted: 2023-01-01

## 2023-10-06 PROBLEM — K21.9 ACID REFLUX: Status: ACTIVE | Noted: 2023-01-01

## 2023-10-06 PROBLEM — R04.2 COUGH WITH HEMOPTYSIS: Status: RESOLVED | Noted: 2023-01-01 | Resolved: 2023-01-01

## 2023-10-06 NOTE — PROGRESS NOTES
Chika Ford is a 78 y.o. female on day 5 of admission presenting with Pneumothorax.    Subjective   Patient seen and examined.  Resting in bed in no acute distress.  Awake alert oriented x 3.  No new complaints.  Dyspnea controlled with Morphine.  Anxiety controlled with Xanax.  Plans to go home with [facility] tomorrow.  No bowel movement in 4 days    Right pleurx drained yesterday yielding > 500 ml fluid       Objective     Physical Exam  Constitutional:       Comments: Frail, thin appearing  female resting in bed in no acute distress   HENT:      Head: Normocephalic and atraumatic.      Right Ear: Tympanic membrane normal.      Left Ear: Tympanic membrane normal.      Nose: Nose normal.      Mouth/Throat:      Mouth: Mucous membranes are moist.      Pharynx: Oropharynx is clear.   Eyes:      Extraocular Movements: Extraocular movements intact.      Conjunctiva/sclera: Conjunctivae normal.      Pupils: Pupils are equal, round, and reactive to light.   Cardiovascular:      Rate and Rhythm: Normal rate and regular rhythm.      Pulses: Normal pulses.      Heart sounds: Normal heart sounds.   Pulmonary:      Effort: Pulmonary effort is normal.      Breath sounds: Normal breath sounds.      Comments: Lung sounds diminished otherwise clear to auscultation bilaterally.  Respirations even and slightly labored with conversation on 2 liters nasal cannula no home oxygen    Right pleurx in place with dressing clean dry intact  Abdominal:      General: Abdomen is flat. Bowel sounds are normal. There is no distension.      Palpations: Abdomen is soft.      Tenderness: There is no abdominal tenderness.   Genitourinary:     Comments: /rectal examination deferred  Musculoskeletal:         General: Normal range of motion.      Cervical back: Normal range of motion and neck supple.   Skin:     General: Skin is warm and dry.      Capillary Refill: Capillary refill takes less than 2 seconds.       Comments: Right pleurx in place with dressing clean dry intact   Neurological:      General: No focal deficit present.      Mental Status: She is alert and oriented to person, place, and time.      Comments: Voice soft  Generalized weakness   No focal weakness   Psychiatric:         Mood and Affect: Mood normal.         Behavior: Behavior normal.       Vital signs reviewed    I's and O's reviewed      Malnutrition Diagnosis Status: New  Malnutrition Diagnosis: Severe malnutrition related to disease or condition  As Evidenced by: po intake <75% > 1 month, 15.3% weight loss in < 6 months, moderate subcutaneous fat and muscle deficits  I agree with the dietitian's malnutrition diagnosis.    Assessment/Plan   Principal Problem:    Pneumothorax  Active Problems:    Primary adenocarcinoma of lung, right (CMS/HCC)    Dyspnea    Cough with hemoptysis  Severe protein calorie malnutrition  Constipation    PLAN:  Patient doing okay this evening.  No new issues.  At the time, symptoms controlled with current medications.  Continue.  Plan for discharge home with [facility] services tomorrow.  DNR CCA DNI.  Right pleurx drained yesterday.  Discussed with patient plan to maintain pleurx catheter and continue to drain upon discharge.  Continue to drain MWF.  Continue oxygen 2 liters nasal cannula.  Continue current medications for symptom control.  Diet as tolerated.  Add bowel regimen.  Continue aggressive bowel regimen upon discharge.  Supportive care.  Patient reassured.  Case management following for discharge planning.  Anticipate discharge home tomorrow when [facility] arrangements are made.  Discussed with Dr. Mckeon.    Yuni Love, APRN-CNP

## 2023-10-06 NOTE — DISCHARGE INSTRUCTIONS
If you have any questions, please contact Dr. Mckeon's office at [phone].    Continue oxygen 2 liters nasal cannula continously

## 2023-10-06 NOTE — NURSING NOTE
RN Hospice Note    Chika Ford is a Hospice Patient.   Hospice terminal diagnosis: lung cancer  Physician: Mike  Visit type: discharge    Comments/recommendations: Met with pt and talked to family via phone regarding discharge home today. O2 concentrator has been delivered and pt/family are able to borrow a tank for discharge home according to Sam in [place]. Pt needs comfort medications in place at the home d/t SOB and medication orders received from Barbie Acosta CNP and ordered for stat  from [facility]-family preference.    Discharge Planning:  Patient to be discharged to home    The following is to be completed:  Discharge order: yes  State DNR signed by MD: done per [facility]  Medication reconciliation: yes  Prescriptions for al narcotics/new medications: done per [facility]  Transportation: family car with portable O2      Plan of care reviewed with patient/family members Pierce, per phone and son Carlos, phone and in person   Plan of care reviewed with hospital staff members: Suzi CHRISTY     Please notify [facility] of any changes in condition. Thank you.  Office: [phone] (8 am-6:30 pm M-F and 8 am-4:30 pm weekends and holidays)   [phone] (6:30 pm-8 am M-F and 4:30 pm-8 am weekends and holidays)    Roslyn Cope RN

## 2023-10-06 NOTE — PROGRESS NOTES
Chika Ford is a 78 y.o. female on day 0 of admission presenting with Pneumothorax.    Subjective   Dr. Mckeon messaged that patient needs an internal referral for [facility].  Patient is active with [facility] but will need the internal referral to resume care.                    Lakia Pitt

## 2023-10-06 NOTE — DISCHARGE SUMMARY
Discharge Diagnosis  Pneumothorax  Primary adenocarcinoma of lung, right  Malignant pleural effusion  Right pleurx catheter  Dyspnea  Cough with hemoptysis resolved  Severe protein calorie malnutrition  Constipation    Discharge Meds  See discharge medication list       Your medication list        START taking these medications        Instructions Last Dose Given Next Dose Due   alum-mag hydroxide-simeth 200-200-20 mg/5 mL oral suspension  Commonly known as: Mylanta      Take 10 mL by mouth 1 time for 1 dose.       melatonin 3 mg tablet      Take 1 tablet (3 mg) by mouth as needed at bedtime for sleep.       omeprazole OTC 20 mg EC tablet  Commonly known as: PriLOSEC OTC      Take 1 tablet (20 mg) by mouth once daily. Do not crush, chew, or split.       oxygen gas therapy  Commonly known as: O2      Inhale 2 L/min continuously.       polyethylene glycol 17 gram packet  Commonly known as: Glycolax, Miralax      Take 17 g by mouth 2 times a day.       sennosides-docusate sodium 8.6-50 mg tablet  Commonly known as: Madhuri-Colace      Take 2 tablets by mouth once daily at bedtime.              CHANGE how you take these medications        Instructions Last Dose Given Next Dose Due   acetaminophen 325 mg tablet  Commonly known as: Tylenol  What changed:   how to take this  reasons to take this      Take 1 tablet (325 mg) by mouth every 6 hours if needed for mild pain (1 - 3).              CONTINUE taking these medications        Instructions Last Dose Given Next Dose Due   albuterol 90 mcg/actuation inhaler           Breo Ellipta 200-25 mcg/dose inhaler  Generic drug: fluticasone furoate-vilanteroL           ferrous sulfate 325 (65 Fe) MG tablet           folic acid 1 mg tablet  Commonly known as: Folvite           tiotropium 18 mcg inhalation capsule  Commonly known as: Spiriva                     Where to Get Your Medications        Information about where to get these medications is not yet available    Ask your nurse or  doctor about these medications  acetaminophen 325 mg tablet  alum-mag hydroxide-simeth 200-200-20 mg/5 mL oral suspension  melatonin 3 mg tablet  omeprazole OTC 20 mg EC tablet  oxygen gas therapy  polyethylene glycol 17 gram packet  sennosides-docusate sodium 8.6-50 mg tablet       Hospital Course  This is a very pleasant 78-year-old elderly  female with a past medical history significant for primary adenocarcinoma of the right lung and malignant pleural effusion who presented to the emergency department with complaints of shortness of breath and cough.  She has a right pleurx catheter in place, drained Monday, Wednesday and Friday by home health care nursing. She is currently under the care of Hematology oncology regarding immunotherapy.  She was recently admitted to [facility] with complaints of shortness of breath attributed to the malfunctioning pleurx catheter.  She was evaluated and treated by Cardiothoracic surgery and Pulmonology.  The Pleurx catheter drained during the hospitalization.  Her symptoms improved and she was discharged home.  She presented back to the emergency department the following day with shortness of breath and cough.  In the emergency department, initial work-up was done.  Chest x-ray imaging per radiology showed a large right hydropneumothorax,  moderate effusion and worsening of the large right pneumothorax.  She underwent drainage of the moderate effusion.  Repeat chest x-ray imaging per radiology showed slight decreased right pleural fluid however the right hydropneumothorax otherwise was not significantly changed.  She was evaluated and treated by thoracic surgery and pulmonology.  She underwent drainage of the right Pleurx catheter.  She was treated with oxygen.  Cardiothoracic surgery and Pulmonology recommended comfort measures.  Palliative medicine was consulted.  She was treated with comfort medications Morphine and Xanax for anxiety.   [facility] with [facility] and signed services.  She is stable for discharge home with [facility].     Pertinent Physical Exam At Time of Discharge  Physical Exam  Constitutional:       Comments: Very pleasant thin, frail, chronically ill appearing  female resting in bed in no acute distress   HENT:      Head: Normocephalic and atraumatic.      Right Ear: Tympanic membrane normal.      Left Ear: Tympanic membrane normal.      Nose: Nose normal.      Mouth/Throat:      Mouth: Mucous membranes are moist.      Pharynx: Oropharynx is clear.   Eyes:      Extraocular Movements: Extraocular movements intact.      Conjunctiva/sclera: Conjunctivae normal.      Pupils: Pupils are equal, round, and reactive to light.   Cardiovascular:      Rate and Rhythm: Normal rate and regular rhythm.      Pulses: Normal pulses.      Heart sounds: Normal heart sounds.   Pulmonary:      Effort: Pulmonary effort is normal.      Breath sounds: Normal breath sounds.      Comments: Lung sounds diminished throughout lung fields.  Respirations even and unlabored on 2 L nasal cannula.  Conversational dyspnea.  Abdominal:      General: Abdomen is flat. Bowel sounds are normal. There is no distension.      Palpations: Abdomen is soft.      Tenderness: There is no abdominal tenderness.   Genitourinary:     Comments: /rectal examination deferred  Musculoskeletal:         General: Normal range of motion.      Cervical back: Normal range of motion and neck supple.   Skin:     General: Skin is warm and dry.      Capillary Refill: Capillary refill takes less than 2 seconds.      Comments: Right pleurx catheter in place with dressing clean dry intact   Neurological:      General: No focal deficit present.      Mental Status: She is alert and oriented to person, place, and time.      Comments: Voice soft  Generalized weakness  No focal weakness   Psychiatric:         Mood and Affect: Mood normal.          Behavior: Behavior normal.       Outpatient Follow-Up  Future Appointments   Date Time Provider Department Center   10/12/2023  2:40 PM Regi Olmos MD MPH [id number] [place]   10/18/2023 12:15 PM [facility]   10/19/2023  1:00 PM [facility] INFUSION 08 [id number] [facility]   10/19/2023  2:00 PM [facility]   10/19/2023  3:00 PM Lyudmila Hilario APRN-CNP [id number] [facility]   11/8/2023  1:20 PM [facility]   11/8/2023  1:30 PM LISSA Flores-CNP [id number] [facility]   11/9/2023  1:30 PM [facility] INFUSION 01 [id number] [facility]     Follow up as advised     Yuni Love APRN-CNP

## 2023-10-06 NOTE — CARE PLAN
The patient's goals for the shift include breath better    The clinical goals for the shift include maintain theraputic oxygen saturation.    Over the shift, the patient did not make progress toward the following goals. Barriers to progression include . Recommendations to address these barriers include .

## 2023-10-06 NOTE — PROGRESS NOTES
"Chika Ford is a 78 y.o. female on day 6 of admission presenting with Pneumothorax, shortness of breath    Subjective       Objective     Physical Exam  Vitals reviewed.   HENT:      Head: Normocephalic and atraumatic.      Right Ear: Tympanic membrane normal.      Left Ear: Tympanic membrane normal.      Nose: Nose normal.      Mouth/Throat:      Mouth: Mucous membranes are moist.      Pharynx: Oropharynx is clear.   Eyes:      Extraocular Movements: Extraocular movements intact.      Conjunctiva/sclera: Conjunctivae normal.      Pupils: Pupils are equal, round, and reactive to light.   Cardiovascular:      Rate and Rhythm: Normal rate and regular rhythm.      Pulses: Normal pulses.      Heart sounds: Normal heart sounds.   Pulmonary:      Effort: Pulmonary effort is normal.      Breath sounds: Normal breath sounds.   Abdominal:      General: Bowel sounds are normal.      Palpations: Abdomen is soft.   Genitourinary:     Comments: Rectal examination deferred  Musculoskeletal:         General: Normal range of motion.      Cervical back: Normal range of motion and neck supple.   Skin:     General: Skin is warm and dry.      Capillary Refill: Capillary refill takes less than 2 seconds.   Neurological:      General: No focal deficit present.      Mental Status: She is alert and oriented to person, place, and time.   Psychiatric:         Mood and Affect: Mood normal.         Behavior: Behavior normal.         Last Recorded Vitals  Blood pressure 127/56, pulse 80, temperature 36.9 °C (98.4 °F), temperature source Oral, resp. rate 18, height 1.651 m (5' 5\"), weight 98.4 kg (216 lb 14.9 oz), SpO2 100 %.  Intake/Output last 3 Shifts:  I/O last 3 completed shifts:  In: 800 (8.1 mL/kg) [P.O.:800]  Out: 0 (0 mL/kg)   Weight: 98.4 kg     Relevant Results  {If you would like to pull in Medications, type .meds     If you would like to pull in Lab results for the last 24 hours, type .[id number]    If you would like to pull in " Imaging results, type .imgrslt :99}    {Link to Stroke Scoring tools - Link :99}                {Current documented malnutrition diagnosis is Severe malnutrition related to disease or condition   As evidenced by po intake <75% > 1 month, 15.3% weight loss in < 6 months, moderate subcutaneous fat and muscle deficits   :99}  {Accept, Reject, Defer:09164}      Assessment/Plan   Principal Problem:    Pneumothorax  Active Problems:    Primary adenocarcinoma of lung, right (CMS/HCC)    Dyspnea    Anxiety    Malignant pleural effusion    Constipation    Acid reflux    ***        I spent *** minutes in the professional and overall care of this patient.      Yuni Love, APRN-CNP

## 2023-10-06 NOTE — PROGRESS NOTES
Occupational Therapy                 Therapy Communication Note    Patient Name: Chika Ford  MRN: [id number]  Today's Date: 10/6/2023     Discipline: Occupational Therapy    Missed Visit Reason: Missed Visit Reason: Patient refused due to fatigue , SOB and awaiting discharge home on hospice today.     Missed Time: 5648

## 2023-10-06 NOTE — NURSING NOTE
Assumed care of patient. Patient resting with call light in reach and shows no s/s of distress at this time. 2L nc in place.

## 2023-10-06 NOTE — NURSING NOTE
Pleurex drained for 275 dark drainage pt tolerated well  eating cup of soup then discharged via w/c and O2 given from Respiratory

## 2023-11-08 ENCOUNTER — APPOINTMENT (OUTPATIENT)
Dept: LAB | Facility: CLINIC | Age: 78
End: 2023-11-08
Payer: MEDICARE

## 2023-11-08 ENCOUNTER — APPOINTMENT (OUTPATIENT)
Dept: HEMATOLOGY/ONCOLOGY | Facility: CLINIC | Age: 78
End: 2023-11-08
Payer: MEDICARE

## 2023-11-09 ENCOUNTER — APPOINTMENT (OUTPATIENT)
Dept: HEMATOLOGY/ONCOLOGY | Facility: CLINIC | Age: 78
End: 2023-11-09
Payer: MEDICARE